# Patient Record
Sex: FEMALE | Race: ASIAN | Employment: FULL TIME | URBAN - METROPOLITAN AREA
[De-identification: names, ages, dates, MRNs, and addresses within clinical notes are randomized per-mention and may not be internally consistent; named-entity substitution may affect disease eponyms.]

---

## 2017-01-25 ENCOUNTER — OFFICE VISIT (OUTPATIENT)
Dept: FAMILY MEDICINE CLINIC | Facility: CLINIC | Age: 47
End: 2017-01-25

## 2017-01-25 VITALS
HEIGHT: 60 IN | TEMPERATURE: 99 F | SYSTOLIC BLOOD PRESSURE: 120 MMHG | BODY MASS INDEX: 29.84 KG/M2 | RESPIRATION RATE: 22 BRPM | WEIGHT: 152 LBS | HEART RATE: 74 BPM | DIASTOLIC BLOOD PRESSURE: 80 MMHG

## 2017-01-25 DIAGNOSIS — R03.0 ELEVATED BLOOD PRESSURE READING: Primary | ICD-10-CM

## 2017-01-25 DIAGNOSIS — E04.9 GOITER: ICD-10-CM

## 2017-01-25 DIAGNOSIS — Z00.00 GENERAL MEDICAL EXAM: ICD-10-CM

## 2017-01-25 PROCEDURE — 99213 OFFICE O/P EST LOW 20 MIN: CPT | Performed by: FAMILY MEDICINE

## 2017-01-25 RX ORDER — HYDRALAZINE HYDROCHLORIDE 10 MG/1
10 TABLET, FILM COATED ORAL 4 TIMES DAILY PRN
Qty: 90 TABLET | Refills: 0 | Status: SHIPPED | OUTPATIENT
Start: 2017-01-25 | End: 2017-05-10

## 2017-01-25 NOTE — PROGRESS NOTES
HPI:   Lori Gonsales is a 42year old female here to discuss blood pressure    Pt has high bp in the am after her night shift at [employer] and if she consumes salt. Pt has high bp anytime she notices a HA.      HTN runs in her family   Pt is exercising an distress  CARDIO: RRR without murmur  LUNGS: clear to auscultation  NECK: supple, +  thyromegaly  HEENT: atraumatic, normocephalic,ears and throat are clear  EYES:PERRLA, EOMI, normal,conjunctiva are clear  SKIN: norashes,no suspicious lesions  GI: good BS

## 2017-02-13 ENCOUNTER — LAB ENCOUNTER (OUTPATIENT)
Dept: LAB | Age: 47
End: 2017-02-13
Attending: FAMILY MEDICINE
Payer: COMMERCIAL

## 2017-02-13 DIAGNOSIS — Z00.00 GENERAL MEDICAL EXAM: ICD-10-CM

## 2017-02-13 LAB
25-HYDROXYVITAMIN D (TOTAL): 39 NG/ML (ref 30–100)
ALBUMIN SERPL-MCNC: 4 G/DL (ref 3.5–4.8)
ALP LIVER SERPL-CCNC: 65 U/L (ref 37–98)
ALT SERPL-CCNC: 42 U/L (ref 14–54)
AST SERPL-CCNC: 28 U/L (ref 15–41)
BASOPHILS # BLD AUTO: 0.06 X10(3) UL (ref 0–0.1)
BASOPHILS NFR BLD AUTO: 0.8 %
BILIRUB SERPL-MCNC: 0.3 MG/DL (ref 0.1–2)
BUN BLD-MCNC: 10 MG/DL (ref 8–20)
CALCIUM BLD-MCNC: 9.2 MG/DL (ref 8.3–10.3)
CHLORIDE: 104 MMOL/L (ref 101–111)
CHOLEST SMN-MCNC: 173 MG/DL (ref ?–200)
CO2: 26 MMOL/L (ref 22–32)
CREAT BLD-MCNC: 0.73 MG/DL (ref 0.55–1.02)
DEPRECATED HBV CORE AB SER IA-ACNC: 81.6 NG/ML (ref 10–291)
EOSINOPHIL # BLD AUTO: 0.22 X10(3) UL (ref 0–0.3)
EOSINOPHIL NFR BLD AUTO: 2.9 %
ERYTHROCYTE [DISTWIDTH] IN BLOOD BY AUTOMATED COUNT: 13.9 % (ref 11.5–16)
FREE T4: 1.2 NG/DL (ref 0.9–1.8)
GLUCOSE BLD-MCNC: 89 MG/DL (ref 70–99)
HAV AB SERPL IA-ACNC: 712 PG/ML (ref 193–986)
HCT VFR BLD AUTO: 42.6 % (ref 34–50)
HDLC SERPL-MCNC: 62 MG/DL (ref 45–?)
HDLC SERPL: 2.79 {RATIO} (ref ?–4.44)
HGB BLD-MCNC: 13.9 G/DL (ref 12–16)
IMMATURE GRANULOCYTE COUNT: 0.02 X10(3) UL (ref 0–1)
IMMATURE GRANULOCYTE RATIO %: 0.3 %
IRON SATURATION: 12 % (ref 13–45)
IRON: 49 UG/DL (ref 28–170)
LDLC SERPL CALC-MCNC: 98 MG/DL (ref ?–130)
LYMPHOCYTES # BLD AUTO: 2.01 X10(3) UL (ref 0.9–4)
LYMPHOCYTES NFR BLD AUTO: 26.9 %
M PROTEIN MFR SERPL ELPH: 7.9 G/DL (ref 6.1–8.3)
MCH RBC QN AUTO: 29.9 PG (ref 27–33.2)
MCHC RBC AUTO-ENTMCNC: 32.6 G/DL (ref 31–37)
MCV RBC AUTO: 91.6 FL (ref 81–100)
MONOCYTES # BLD AUTO: 0.67 X10(3) UL (ref 0.1–0.6)
MONOCYTES NFR BLD AUTO: 9 %
NEUTROPHIL ABS PRELIM: 4.48 X10 (3) UL (ref 1.3–6.7)
NEUTROPHILS # BLD AUTO: 4.48 X10(3) UL (ref 1.3–6.7)
NEUTROPHILS NFR BLD AUTO: 60.1 %
NONHDLC SERPL-MCNC: 111 MG/DL (ref ?–130)
PLATELET # BLD AUTO: 329 10(3)UL (ref 150–450)
POTASSIUM SERPL-SCNC: 4.1 MMOL/L (ref 3.6–5.1)
RBC # BLD AUTO: 4.65 X10(6)UL (ref 3.8–5.1)
RED CELL DISTRIBUTION WIDTH-SD: 46.6 FL (ref 35.1–46.3)
SODIUM SERPL-SCNC: 138 MMOL/L (ref 136–144)
T3FREE SERPL-MCNC: 2.44 PG/ML (ref 2.3–4.2)
TOTAL IRON BINDING CAPACITY: 408 UG/DL (ref 298–536)
TRANSFERRIN: 274 MG/DL (ref 200–360)
TRIGLYCERIDES: 66 MG/DL (ref ?–150)
TSI SER-ACNC: 0.65 MIU/ML (ref 0.35–5.5)
VLDL: 13 MG/DL (ref 5–40)
WBC # BLD AUTO: 7.5 X10(3) UL (ref 4–13)

## 2017-02-13 PROCEDURE — 84443 ASSAY THYROID STIM HORMONE: CPT

## 2017-02-13 PROCEDURE — 83540 ASSAY OF IRON: CPT

## 2017-02-13 PROCEDURE — 84481 FREE ASSAY (FT-3): CPT

## 2017-02-13 PROCEDURE — 82607 VITAMIN B-12: CPT

## 2017-02-13 PROCEDURE — 82728 ASSAY OF FERRITIN: CPT

## 2017-02-13 PROCEDURE — 83550 IRON BINDING TEST: CPT

## 2017-02-13 PROCEDURE — 84439 ASSAY OF FREE THYROXINE: CPT

## 2017-02-13 PROCEDURE — 36415 COLL VENOUS BLD VENIPUNCTURE: CPT

## 2017-02-13 PROCEDURE — 82306 VITAMIN D 25 HYDROXY: CPT

## 2017-02-13 PROCEDURE — 85025 COMPLETE CBC W/AUTO DIFF WBC: CPT

## 2017-02-13 PROCEDURE — 80061 LIPID PANEL: CPT

## 2017-02-13 PROCEDURE — 80053 COMPREHEN METABOLIC PANEL: CPT

## 2017-03-30 ENCOUNTER — OFFICE VISIT (OUTPATIENT)
Dept: FAMILY MEDICINE CLINIC | Facility: CLINIC | Age: 47
End: 2017-03-30

## 2017-03-30 VITALS
DIASTOLIC BLOOD PRESSURE: 80 MMHG | HEART RATE: 79 BPM | BODY MASS INDEX: 30 KG/M2 | WEIGHT: 152 LBS | SYSTOLIC BLOOD PRESSURE: 120 MMHG | OXYGEN SATURATION: 98 % | RESPIRATION RATE: 16 BRPM | TEMPERATURE: 98 F

## 2017-03-30 DIAGNOSIS — Z23 NEED FOR TDAP VACCINATION: Primary | ICD-10-CM

## 2017-03-30 PROCEDURE — 90715 TDAP VACCINE 7 YRS/> IM: CPT | Performed by: NURSE PRACTITIONER

## 2017-03-30 PROCEDURE — 90471 IMMUNIZATION ADMIN: CPT | Performed by: NURSE PRACTITIONER

## 2017-04-17 ENCOUNTER — TELEPHONE (OUTPATIENT)
Dept: FAMILY MEDICINE CLINIC | Facility: CLINIC | Age: 47
End: 2017-04-17

## 2017-04-17 DIAGNOSIS — L84 CORNS: Primary | ICD-10-CM

## 2017-04-17 NOTE — TELEPHONE ENCOUNTER
Patient called central referrals, she is requesting a referral to podiatrist for a corn on her foot. Dr Tayo Watt.        Internal  Diagnosis:  L84  3 visits

## 2017-04-25 ENCOUNTER — OFFICE VISIT (OUTPATIENT)
Dept: FAMILY MEDICINE CLINIC | Facility: CLINIC | Age: 47
End: 2017-04-25

## 2017-04-25 VITALS
RESPIRATION RATE: 18 BRPM | HEART RATE: 70 BPM | TEMPERATURE: 99 F | BODY MASS INDEX: 28.33 KG/M2 | OXYGEN SATURATION: 99 % | DIASTOLIC BLOOD PRESSURE: 70 MMHG | SYSTOLIC BLOOD PRESSURE: 104 MMHG | HEIGHT: 61.5 IN | WEIGHT: 152 LBS

## 2017-04-25 DIAGNOSIS — B07.0 PLANTAR WART: ICD-10-CM

## 2017-04-25 DIAGNOSIS — M79.671 FOOT PAIN, RIGHT: Primary | ICD-10-CM

## 2017-04-25 PROCEDURE — 99213 OFFICE O/P EST LOW 20 MIN: CPT | Performed by: FAMILY MEDICINE

## 2017-04-25 NOTE — PROGRESS NOTES
HPI:    Patient ID: Emiliano Glynn is a 42year old female. Foot Pain   Pain location: bottom of right foot. This is a chronic problem. The current episode started more than 1 month ago. There has been no history of extremity trauma.  The problem occur in this encounter.        Meds This Visit:  No prescriptions requested or ordered in this encounter    Imaging & Referrals:  None       SI#[id number]

## 2017-05-10 ENCOUNTER — OFFICE VISIT (OUTPATIENT)
Dept: FAMILY MEDICINE CLINIC | Facility: CLINIC | Age: 47
End: 2017-05-10

## 2017-05-10 VITALS
WEIGHT: 154 LBS | SYSTOLIC BLOOD PRESSURE: 116 MMHG | BODY MASS INDEX: 28.7 KG/M2 | HEIGHT: 61.5 IN | RESPIRATION RATE: 18 BRPM | HEART RATE: 67 BPM | DIASTOLIC BLOOD PRESSURE: 74 MMHG | OXYGEN SATURATION: 98 %

## 2017-05-10 DIAGNOSIS — E04.9 GOITER: ICD-10-CM

## 2017-05-10 DIAGNOSIS — Z12.31 ENCOUNTER FOR SCREENING MAMMOGRAM FOR HIGH-RISK PATIENT: ICD-10-CM

## 2017-05-10 DIAGNOSIS — Z01.419 WELL WOMAN EXAM WITH ROUTINE GYNECOLOGICAL EXAM: Primary | ICD-10-CM

## 2017-05-10 PROCEDURE — 87624 HPV HI-RISK TYP POOLED RSLT: CPT | Performed by: FAMILY MEDICINE

## 2017-05-10 PROCEDURE — 88175 CYTOPATH C/V AUTO FLUID REDO: CPT | Performed by: FAMILY MEDICINE

## 2017-05-10 PROCEDURE — 99396 PREV VISIT EST AGE 40-64: CPT | Performed by: FAMILY MEDICINE

## 2017-05-10 NOTE — PROGRESS NOTES
HPI:   Adam Franklin is a 41year old female who presents for a complete physical exam.     Wt Readings from Last 6 Encounters:  05/10/17 : 154 lb  04/25/17 : 152 lb  03/30/17 : 152 lb  01/25/17 : 152 lb  02/11/16 : 152 lb  11/18/15 : 151 lb    Body mas otherwise  SKIN: denies any unusual skin lesions  EYES:denies blurred vision or double vision  HEENT: denies nasal congestion, sinus pain or ST  LUNGS: denies shortness of breath with exertion  CARDIOVASCULAR: denies chest pain on exertion  GI: denies abdo and self breast exams. Questions answered and patient indicates understanding of these issues and agrees to the plan. Follow up in 1 year or sooner if needed.

## 2017-05-23 ENCOUNTER — HOSPITAL ENCOUNTER (OUTPATIENT)
Dept: MAMMOGRAPHY | Age: 47
Discharge: HOME | End: 2017-05-23
Attending: FAMILY MEDICINE
Payer: COMMERCIAL

## 2017-05-23 ENCOUNTER — HOSPITAL ENCOUNTER (OUTPATIENT)
Dept: ULTRASOUND IMAGING | Age: 47
Discharge: HOME | End: 2017-05-23
Attending: FAMILY MEDICINE
Payer: COMMERCIAL

## 2017-05-23 DIAGNOSIS — Z12.31 ENCOUNTER FOR SCREENING MAMMOGRAM FOR HIGH-RISK PATIENT: ICD-10-CM

## 2017-05-23 DIAGNOSIS — E04.9 GOITER: ICD-10-CM

## 2017-05-23 PROCEDURE — 77067 SCR MAMMO BI INCL CAD: CPT | Performed by: FAMILY MEDICINE

## 2017-05-23 PROCEDURE — 77063 BREAST TOMOSYNTHESIS BI: CPT | Performed by: FAMILY MEDICINE

## 2017-05-23 PROCEDURE — 76536 US EXAM OF HEAD AND NECK: CPT | Performed by: FAMILY MEDICINE

## 2017-11-14 ENCOUNTER — TELEPHONE (OUTPATIENT)
Dept: FAMILY MEDICINE CLINIC | Facility: CLINIC | Age: 47
End: 2017-11-14

## 2017-11-14 NOTE — TELEPHONE ENCOUNTER
Patient called and is complaining of headache and HBP . She wanted a refill of her BP medication. I don't see one on file. She stated that she used to take it and then stopped because she was exercising. She gave it to someone in the family.   Sending to

## 2017-11-14 NOTE — TELEPHONE ENCOUNTER
Pt states has been monitoring b/p, and has been eating healthy, and exercising. Having headaches after her menstrual cycle. Checked b/p this afternoon 140/95. Pt using motrin for migraine with no relief. No chest pain. Migraines continue.   Advised pt to

## 2017-11-16 ENCOUNTER — OFFICE VISIT (OUTPATIENT)
Dept: FAMILY MEDICINE CLINIC | Facility: CLINIC | Age: 47
End: 2017-11-16

## 2017-11-16 VITALS
TEMPERATURE: 98 F | WEIGHT: 158 LBS | OXYGEN SATURATION: 97 % | SYSTOLIC BLOOD PRESSURE: 136 MMHG | BODY MASS INDEX: 29.83 KG/M2 | DIASTOLIC BLOOD PRESSURE: 80 MMHG | RESPIRATION RATE: 16 BRPM | HEART RATE: 70 BPM | HEIGHT: 61 IN

## 2017-11-16 DIAGNOSIS — F43.9 STRESS: ICD-10-CM

## 2017-11-16 DIAGNOSIS — I10 ESSENTIAL HYPERTENSION: Primary | ICD-10-CM

## 2017-11-16 PROCEDURE — 99213 OFFICE O/P EST LOW 20 MIN: CPT | Performed by: FAMILY MEDICINE

## 2017-11-16 RX ORDER — METOPROLOL SUCCINATE 25 MG/1
25 TABLET, EXTENDED RELEASE ORAL DAILY
Qty: 30 TABLET | Refills: 0 | Status: SHIPPED | OUTPATIENT
Start: 2017-11-16 | End: 2018-09-05

## 2017-11-16 NOTE — PROGRESS NOTES
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 Pt is now reducing salt   No regular exercise    Pt has high bp anytime she notices a HA.      HTN runs in her famil murmur  LUNGS: clear to auscultation  NECK: supple, no JVD, no carotid bruit   HEENT: atraumatic, normocephalic,ears and throat are clear  EYES:PERRLA, EOMI, normal,conjunctiva are clear  SKIN: norashes,no suspicious lesions  GI: good BS's,no masses, HSM o

## 2017-12-13 ENCOUNTER — TELEPHONE (OUTPATIENT)
Dept: FAMILY MEDICINE CLINIC | Facility: CLINIC | Age: 47
End: 2017-12-13

## 2017-12-13 NOTE — TELEPHONE ENCOUNTER
Pt had a 12 noon appt with Rojelio Kimbrough today and no Showed. When called she states she called to cancel. PSR's here this AM state that no call was received by this patient. This appt made a NO Show today.

## 2018-09-05 ENCOUNTER — OFFICE VISIT (OUTPATIENT)
Dept: FAMILY MEDICINE CLINIC | Facility: CLINIC | Age: 48
End: 2018-09-05

## 2018-09-05 VITALS
BODY MASS INDEX: 30.21 KG/M2 | DIASTOLIC BLOOD PRESSURE: 86 MMHG | HEART RATE: 78 BPM | RESPIRATION RATE: 16 BRPM | SYSTOLIC BLOOD PRESSURE: 122 MMHG | HEIGHT: 61 IN | WEIGHT: 160 LBS | TEMPERATURE: 99 F

## 2018-09-05 DIAGNOSIS — Z01.419 WELL WOMAN EXAM WITH ROUTINE GYNECOLOGICAL EXAM: Primary | ICD-10-CM

## 2018-09-05 DIAGNOSIS — Z00.00 ANNUAL PHYSICAL EXAM: ICD-10-CM

## 2018-09-05 DIAGNOSIS — E04.9 GOITER: ICD-10-CM

## 2018-09-05 DIAGNOSIS — F43.9 STRESS: ICD-10-CM

## 2018-09-05 DIAGNOSIS — G43.009 MIGRAINE WITHOUT AURA AND WITHOUT STATUS MIGRAINOSUS, NOT INTRACTABLE: ICD-10-CM

## 2018-09-05 DIAGNOSIS — Z80.0 FAMILY HISTORY OF COLON CANCER: ICD-10-CM

## 2018-09-05 DIAGNOSIS — Z12.31 ENCOUNTER FOR SCREENING MAMMOGRAM FOR HIGH-RISK PATIENT: ICD-10-CM

## 2018-09-05 PROCEDURE — 99396 PREV VISIT EST AGE 40-64: CPT | Performed by: FAMILY MEDICINE

## 2018-09-05 RX ORDER — METOPROLOL SUCCINATE 25 MG/1
25 TABLET, EXTENDED RELEASE ORAL DAILY
Qty: 30 TABLET | Refills: 0 | Status: SHIPPED | OUTPATIENT
Start: 2018-09-05 | End: 2019-09-11

## 2018-09-05 NOTE — PROGRESS NOTES
HPI:   Juan Lyle is a 57year old female who presents for a complete physical exam.     Wt Readings from Last 6 Encounters:  09/05/18 : 160 lb  11/16/17 : 158 lb  05/10/17 : 154 lb  04/25/17 : 152 lb  03/30/17 : 152 lb  01/25/17 : 152 lb    Body mas 63's   • colon cancer[other] [OTHER] Other      family hx   • emphysema[other] [OTHER] Father    • ulcer[other] [OTHER] Father      gastric   • Hypertension Mother       Social History:   Smoking status: Never Smoker MUSCULOSKELETAL: back is not tender,FROM of the back  EXTREMITIES: no cyanosis, clubbing or edema  NEURO: cranial nerves are intact,motor and sensory are grossly intact    ASSESSMENT ND PLAN:   Viraj Holly is a 57year old female here with     1.  We

## 2018-10-02 ENCOUNTER — LAB ENCOUNTER (OUTPATIENT)
Dept: LAB | Age: 48
End: 2018-10-02
Attending: FAMILY MEDICINE
Payer: COMMERCIAL

## 2018-10-02 DIAGNOSIS — Z00.00 ANNUAL PHYSICAL EXAM: ICD-10-CM

## 2018-10-02 PROCEDURE — 80061 LIPID PANEL: CPT

## 2018-10-02 PROCEDURE — 82607 VITAMIN B-12: CPT

## 2018-10-02 PROCEDURE — 36415 COLL VENOUS BLD VENIPUNCTURE: CPT

## 2018-10-02 PROCEDURE — 82306 VITAMIN D 25 HYDROXY: CPT

## 2018-10-02 PROCEDURE — 80053 COMPREHEN METABOLIC PANEL: CPT

## 2018-10-02 PROCEDURE — 84439 ASSAY OF FREE THYROXINE: CPT

## 2018-10-02 PROCEDURE — 84443 ASSAY THYROID STIM HORMONE: CPT

## 2018-10-02 PROCEDURE — 85025 COMPLETE CBC W/AUTO DIFF WBC: CPT

## 2018-10-04 ENCOUNTER — HOSPITAL ENCOUNTER (OUTPATIENT)
Dept: MAMMOGRAPHY | Age: 48
Discharge: HOME | End: 2018-10-04
Attending: FAMILY MEDICINE
Payer: COMMERCIAL

## 2018-10-04 ENCOUNTER — HOSPITAL ENCOUNTER (OUTPATIENT)
Dept: ULTRASOUND IMAGING | Age: 48
Discharge: HOME | End: 2018-10-04
Attending: FAMILY MEDICINE
Payer: COMMERCIAL

## 2018-10-04 DIAGNOSIS — E04.9 GOITER: ICD-10-CM

## 2018-10-04 DIAGNOSIS — Z12.31 ENCOUNTER FOR SCREENING MAMMOGRAM FOR HIGH-RISK PATIENT: ICD-10-CM

## 2018-10-04 PROCEDURE — 76536 US EXAM OF HEAD AND NECK: CPT | Performed by: FAMILY MEDICINE

## 2018-10-04 PROCEDURE — 77067 SCR MAMMO BI INCL CAD: CPT | Performed by: FAMILY MEDICINE

## 2018-10-04 PROCEDURE — 77063 BREAST TOMOSYNTHESIS BI: CPT | Performed by: FAMILY MEDICINE

## 2019-09-11 ENCOUNTER — OFFICE VISIT (OUTPATIENT)
Dept: FAMILY MEDICINE CLINIC | Facility: CLINIC | Age: 49
End: 2019-09-11

## 2019-09-11 VITALS
DIASTOLIC BLOOD PRESSURE: 70 MMHG | HEIGHT: 61 IN | OXYGEN SATURATION: 98 % | BODY MASS INDEX: 30.21 KG/M2 | RESPIRATION RATE: 16 BRPM | HEART RATE: 94 BPM | TEMPERATURE: 98 F | SYSTOLIC BLOOD PRESSURE: 106 MMHG | WEIGHT: 160 LBS

## 2019-09-11 DIAGNOSIS — Z00.00 ANNUAL PHYSICAL EXAM: ICD-10-CM

## 2019-09-11 DIAGNOSIS — Z01.419 WELL WOMAN EXAM WITH ROUTINE GYNECOLOGICAL EXAM: Primary | ICD-10-CM

## 2019-09-11 DIAGNOSIS — Z12.31 ENCOUNTER FOR SCREENING MAMMOGRAM FOR HIGH-RISK PATIENT: ICD-10-CM

## 2019-09-11 DIAGNOSIS — E04.9 GOITER: ICD-10-CM

## 2019-09-11 PROCEDURE — 88175 CYTOPATH C/V AUTO FLUID REDO: CPT | Performed by: FAMILY MEDICINE

## 2019-09-11 PROCEDURE — 87624 HPV HI-RISK TYP POOLED RSLT: CPT | Performed by: FAMILY MEDICINE

## 2019-09-11 PROCEDURE — 99396 PREV VISIT EST AGE 40-64: CPT | Performed by: FAMILY MEDICINE

## 2019-09-11 NOTE — PROGRESS NOTES
HPI:   Sallyanne Soulier is a 54year old female who presents for a complete physical exam.     Wt Readings from Last 6 Encounters:  09/11/19 : 160 lb  09/05/18 : 160 lb  11/16/17 : 158 lb  05/10/17 : 154 lb  04/25/17 : 152 lb  03/30/17 : 152 lb    Body mas Family History   Problem Relation Age of Onset   • Breast Cancer Maternal Aunt 58        early 63's   • Other (colon cancer[other]) Other         family hx   • Other (emphysema[other]) Father    • Other (ulcer[other]) Father         gastric   • Hypertens Bimanual exam- no adnexal masses or tenderness  RECTAL:good rectal tone,no mass, brown stool, stool is OB negative   MUSCULOSKELETAL: back is not tender,FROM of the back  EXTREMITIES: no cyanosis, clubbing or edema  NEURO: cranial nerves are intact,motor a

## 2019-09-12 LAB — HPV I/H RISK 1 DNA SPEC QL NAA+PROBE: NEGATIVE

## 2019-09-27 ENCOUNTER — TELEPHONE (OUTPATIENT)
Dept: FAMILY MEDICINE CLINIC | Facility: CLINIC | Age: 49
End: 2019-09-27

## 2019-10-16 ENCOUNTER — LAB ENCOUNTER (OUTPATIENT)
Dept: LAB | Age: 49
End: 2019-10-16
Attending: FAMILY MEDICINE
Payer: COMMERCIAL

## 2019-10-16 ENCOUNTER — HOSPITAL ENCOUNTER (OUTPATIENT)
Dept: ULTRASOUND IMAGING | Age: 49
Discharge: HOME | End: 2019-10-16
Attending: FAMILY MEDICINE
Payer: COMMERCIAL

## 2019-10-16 ENCOUNTER — HOSPITAL ENCOUNTER (OUTPATIENT)
Dept: MAMMOGRAPHY | Age: 49
Discharge: HOME | End: 2019-10-16
Attending: FAMILY MEDICINE
Payer: COMMERCIAL

## 2019-10-16 DIAGNOSIS — Z00.00 ANNUAL PHYSICAL EXAM: ICD-10-CM

## 2019-10-16 DIAGNOSIS — E04.9 GOITER: ICD-10-CM

## 2019-10-16 DIAGNOSIS — Z12.31 ENCOUNTER FOR SCREENING MAMMOGRAM FOR HIGH-RISK PATIENT: ICD-10-CM

## 2019-10-16 PROCEDURE — 77063 BREAST TOMOSYNTHESIS BI: CPT | Performed by: FAMILY MEDICINE

## 2019-10-16 PROCEDURE — 77067 SCR MAMMO BI INCL CAD: CPT | Performed by: FAMILY MEDICINE

## 2019-10-16 PROCEDURE — 84439 ASSAY OF FREE THYROXINE: CPT

## 2019-10-16 PROCEDURE — 80053 COMPREHEN METABOLIC PANEL: CPT

## 2019-10-16 PROCEDURE — 76536 US EXAM OF HEAD AND NECK: CPT | Performed by: FAMILY MEDICINE

## 2019-10-16 PROCEDURE — 82306 VITAMIN D 25 HYDROXY: CPT

## 2019-10-16 PROCEDURE — 80061 LIPID PANEL: CPT

## 2019-10-16 PROCEDURE — 82607 VITAMIN B-12: CPT

## 2019-10-16 PROCEDURE — 85025 COMPLETE CBC W/AUTO DIFF WBC: CPT

## 2019-10-16 PROCEDURE — 84443 ASSAY THYROID STIM HORMONE: CPT

## 2019-10-16 PROCEDURE — 36415 COLL VENOUS BLD VENIPUNCTURE: CPT

## 2019-10-17 ENCOUNTER — TELEPHONE (OUTPATIENT)
Dept: FAMILY MEDICINE CLINIC | Facility: CLINIC | Age: 49
End: 2019-10-17

## 2020-01-22 ENCOUNTER — HOSPITAL ENCOUNTER (OUTPATIENT)
Dept: ULTRASOUND IMAGING | Facility: HOSPITAL | Age: 50
Discharge: HOME | End: 2020-01-22
Attending: OTOLARYNGOLOGY
Payer: COMMERCIAL

## 2020-01-22 DIAGNOSIS — E04.2 MULTINODULAR GOITER: ICD-10-CM

## 2020-01-22 PROCEDURE — 88173 CYTOPATH EVAL FNA REPORT: CPT | Performed by: OTOLARYNGOLOGY

## 2020-01-22 PROCEDURE — 10005 FNA BX W/US GDN 1ST LES: CPT | Performed by: OTOLARYNGOLOGY

## 2020-01-22 NOTE — PROGRESS NOTES
PROCEDURE: US FNA THYROID, GUIDED INCLD, FIRST LESION (CPT=10005)    COMPARISON: [facility], US, US THYROID (MJP=44460), 10/16/2019, 19:31.     INDICATIONS: E04.2 Nontoxic multinodular goiter    DESCRIPTION: The risks, benefits, and alternatives of the proc

## 2020-05-26 ENCOUNTER — VIRTUAL PHONE E/M (OUTPATIENT)
Dept: FAMILY MEDICINE CLINIC | Facility: CLINIC | Age: 50
End: 2020-05-26

## 2020-05-26 DIAGNOSIS — M54.16 CHRONIC RADICULAR LUMBAR PAIN: Primary | ICD-10-CM

## 2020-05-26 DIAGNOSIS — G89.29 CHRONIC RADICULAR LUMBAR PAIN: Primary | ICD-10-CM

## 2020-05-26 PROCEDURE — 99214 OFFICE O/P EST MOD 30 MIN: CPT | Performed by: FAMILY MEDICINE

## 2020-05-26 RX ORDER — PREDNISONE 20 MG/1
60 TABLET ORAL DAILY
Qty: 15 TABLET | Refills: 0 | Status: SHIPPED | OUTPATIENT
Start: 2020-05-26 | End: 2020-05-31

## 2020-05-26 RX ORDER — CYCLOBENZAPRINE HCL 5 MG
5 TABLET ORAL 3 TIMES DAILY PRN
Qty: 30 TABLET | Refills: 0 | Status: SHIPPED | OUTPATIENT
Start: 2020-05-26 | End: 2021-10-05

## 2020-05-26 NOTE — PROGRESS NOTES
Virtual Telephone Check-In    Brittaniemaggie ThorntonBonnie verbally consents to a Virtual/Telephone Check-In visit on 05/26/20. Patient has been referred to the [facility] website at [email or web address] to review the yearly Consent to Treat document.     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   D

## 2020-06-25 ENCOUNTER — OFFICE VISIT (OUTPATIENT)
Dept: FAMILY MEDICINE CLINIC | Facility: CLINIC | Age: 50
End: 2020-06-25

## 2020-06-25 DIAGNOSIS — Z02.9 ADMINISTRATIVE ENCOUNTER: Primary | ICD-10-CM

## 2020-06-25 NOTE — PROGRESS NOTES
Patient here for headache which she attributes to eating salty food but to request medication to temporarily reduce BP which has been up 159/100 since yesterday. \"I just want something I can take sometimes when I need it\" Referred to family doctor.

## 2020-06-26 ENCOUNTER — OFFICE VISIT (OUTPATIENT)
Dept: FAMILY MEDICINE CLINIC | Facility: CLINIC | Age: 50
End: 2020-06-26

## 2020-06-26 VITALS
RESPIRATION RATE: 16 BRPM | BODY MASS INDEX: 30.21 KG/M2 | WEIGHT: 160 LBS | DIASTOLIC BLOOD PRESSURE: 86 MMHG | SYSTOLIC BLOOD PRESSURE: 136 MMHG | OXYGEN SATURATION: 98 % | HEART RATE: 82 BPM | TEMPERATURE: 98 F | HEIGHT: 61 IN

## 2020-06-26 DIAGNOSIS — G43.009 MIGRAINE WITHOUT AURA AND WITHOUT STATUS MIGRAINOSUS, NOT INTRACTABLE: Primary | ICD-10-CM

## 2020-06-26 DIAGNOSIS — R03.0 ELEVATED BLOOD PRESSURE READING: ICD-10-CM

## 2020-06-26 PROCEDURE — 99213 OFFICE O/P EST LOW 20 MIN: CPT | Performed by: FAMILY MEDICINE

## 2020-06-26 RX ORDER — PROPRANOLOL HYDROCHLORIDE 20 MG/1
20 TABLET ORAL 2 TIMES DAILY
Qty: 60 TABLET | Refills: 0 | Status: SHIPPED | OUTPATIENT
Start: 2020-06-26 | End: 2021-07-14

## 2020-06-26 NOTE — PROGRESS NOTES
HPI:   Herlinda Helms is a 52year old female here to discuss blood pressure and HA    Pt needed prn bp meds in 2017 and 2018   Pt has had HA and high bp the last few days in the am   Pt has a left sided HA after her menses   FDLMP 6/22/2020  + family h/ denies abdominal pain,denies heartburn  MUSCULOSKELETAL: denies back pain  PSYCHE: denies depression or anxiety  HEMATOLOGIC: denies hx of anemia    EXAM:   /86   Pulse 82   Temp 97.6 °F (36.4 °C) (Skin)   Resp 16   Ht 61\"   Wt 160 lb (72.6 kg)   LM

## 2020-09-29 ENCOUNTER — OFFICE VISIT (OUTPATIENT)
Dept: FAMILY MEDICINE CLINIC | Facility: CLINIC | Age: 50
End: 2020-09-29

## 2020-09-29 VITALS
HEIGHT: 61 IN | DIASTOLIC BLOOD PRESSURE: 72 MMHG | WEIGHT: 163 LBS | BODY MASS INDEX: 30.78 KG/M2 | HEART RATE: 77 BPM | SYSTOLIC BLOOD PRESSURE: 126 MMHG | TEMPERATURE: 97 F

## 2020-09-29 DIAGNOSIS — Z00.00 ANNUAL PHYSICAL EXAM: ICD-10-CM

## 2020-09-29 DIAGNOSIS — Z12.31 ENCOUNTER FOR SCREENING MAMMOGRAM FOR HIGH-RISK PATIENT: ICD-10-CM

## 2020-09-29 DIAGNOSIS — Z01.419 WELL WOMAN EXAM WITH ROUTINE GYNECOLOGICAL EXAM: Primary | ICD-10-CM

## 2020-09-29 DIAGNOSIS — E04.9 GOITER: ICD-10-CM

## 2020-09-29 PROCEDURE — 3078F DIAST BP <80 MM HG: CPT | Performed by: FAMILY MEDICINE

## 2020-09-29 PROCEDURE — 99396 PREV VISIT EST AGE 40-64: CPT | Performed by: FAMILY MEDICINE

## 2020-09-29 PROCEDURE — 3074F SYST BP LT 130 MM HG: CPT | Performed by: FAMILY MEDICINE

## 2020-09-29 PROCEDURE — 3008F BODY MASS INDEX DOCD: CPT | Performed by: FAMILY MEDICINE

## 2020-09-29 NOTE — PROGRESS NOTES
HPI:   Eric Stevenson is a 52year old female who presents for a complete physical exam.     Wt Readings from Last 6 Encounters:  09/29/20 : 163 lb (73.9 kg)  06/26/20 : 160 lb (72.6 kg)  09/11/19 : 160 lb (72.6 kg)  09/05/18 : 160 lb (72.6 kg)  11/16/17 Diagnosis Date   • Abdominal pain, unspecified site    • Dysuria    • Personal history of urinary (tract) infection       Past Surgical History:   Procedure Laterality Date   • TUBAL LIGATION  1/1/08      Family History   Problem Relation Age of Onset axillary LAD, no masses no nipple discharge bilaterally  : external genitalia - no inguinal LAD, no lesions. Speculum exam- introitus is normal,scant discharge,cervix is pink.  Bimanual exam- no adnexal masses or tenderness  RECTAL:good rectal tone,no ma

## 2020-10-13 ENCOUNTER — LAB ENCOUNTER (OUTPATIENT)
Dept: LAB | Age: 50
End: 2020-10-13
Attending: FAMILY MEDICINE
Payer: COMMERCIAL

## 2020-10-13 ENCOUNTER — HOSPITAL ENCOUNTER (OUTPATIENT)
Dept: MAMMOGRAPHY | Age: 50
Discharge: HOME | End: 2020-10-13
Attending: FAMILY MEDICINE
Payer: COMMERCIAL

## 2020-10-13 ENCOUNTER — HOSPITAL ENCOUNTER (OUTPATIENT)
Dept: ULTRASOUND IMAGING | Age: 50
Discharge: HOME | End: 2020-10-13
Attending: FAMILY MEDICINE
Payer: COMMERCIAL

## 2020-10-13 DIAGNOSIS — E04.9 GOITER: ICD-10-CM

## 2020-10-13 DIAGNOSIS — R79.89 LOW TSH LEVEL: ICD-10-CM

## 2020-10-13 DIAGNOSIS — Z12.31 ENCOUNTER FOR SCREENING MAMMOGRAM FOR HIGH-RISK PATIENT: ICD-10-CM

## 2020-10-13 DIAGNOSIS — Z00.00 ANNUAL PHYSICAL EXAM: ICD-10-CM

## 2020-10-13 DIAGNOSIS — E78.00 ELEVATED LDL CHOLESTEROL LEVEL: ICD-10-CM

## 2020-10-13 PROCEDURE — 77063 BREAST TOMOSYNTHESIS BI: CPT | Performed by: FAMILY MEDICINE

## 2020-10-13 PROCEDURE — 86376 MICROSOMAL ANTIBODY EACH: CPT

## 2020-10-13 PROCEDURE — 86800 THYROGLOBULIN ANTIBODY: CPT

## 2020-10-13 PROCEDURE — 76536 US EXAM OF HEAD AND NECK: CPT | Performed by: FAMILY MEDICINE

## 2020-10-13 PROCEDURE — 84439 ASSAY OF FREE THYROXINE: CPT

## 2020-10-13 PROCEDURE — 80061 LIPID PANEL: CPT

## 2020-10-13 PROCEDURE — 80053 COMPREHEN METABOLIC PANEL: CPT

## 2020-10-13 PROCEDURE — 77067 SCR MAMMO BI INCL CAD: CPT | Performed by: FAMILY MEDICINE

## 2020-10-13 PROCEDURE — 84443 ASSAY THYROID STIM HORMONE: CPT

## 2020-10-13 PROCEDURE — 82607 VITAMIN B-12: CPT

## 2020-10-13 PROCEDURE — 85025 COMPLETE CBC W/AUTO DIFF WBC: CPT

## 2020-10-13 PROCEDURE — 82306 VITAMIN D 25 HYDROXY: CPT

## 2020-10-13 PROCEDURE — 36415 COLL VENOUS BLD VENIPUNCTURE: CPT

## 2020-10-30 ENCOUNTER — HOSPITAL ENCOUNTER (OUTPATIENT)
Dept: ULTRASOUND IMAGING | Age: 50
Discharge: HOME | End: 2020-10-30
Attending: FAMILY MEDICINE
Payer: COMMERCIAL

## 2020-10-30 ENCOUNTER — HOSPITAL ENCOUNTER (OUTPATIENT)
Dept: MAMMOGRAPHY | Age: 50
Discharge: HOME | End: 2020-10-30
Attending: FAMILY MEDICINE
Payer: COMMERCIAL

## 2020-10-30 DIAGNOSIS — R92.2 INCONCLUSIVE MAMMOGRAM: ICD-10-CM

## 2020-10-30 PROCEDURE — 77061 BREAST TOMOSYNTHESIS UNI: CPT | Performed by: FAMILY MEDICINE

## 2020-10-30 PROCEDURE — 76642 ULTRASOUND BREAST LIMITED: CPT | Performed by: FAMILY MEDICINE

## 2020-10-30 PROCEDURE — 77065 DX MAMMO INCL CAD UNI: CPT | Performed by: FAMILY MEDICINE

## 2021-01-12 ENCOUNTER — TELEMEDICINE (OUTPATIENT)
Dept: FAMILY MEDICINE CLINIC | Facility: CLINIC | Age: 51
End: 2021-01-12

## 2021-01-12 ENCOUNTER — TELEPHONE (OUTPATIENT)
Dept: FAMILY MEDICINE CLINIC | Facility: CLINIC | Age: 51
End: 2021-01-12

## 2021-01-12 VITALS — SYSTOLIC BLOOD PRESSURE: 140 MMHG | DIASTOLIC BLOOD PRESSURE: 90 MMHG

## 2021-01-12 DIAGNOSIS — R03.0 ELEVATED BLOOD PRESSURE READING: Primary | ICD-10-CM

## 2021-01-12 DIAGNOSIS — F43.9 STRESS: ICD-10-CM

## 2021-01-12 DIAGNOSIS — G43.009 MIGRAINE WITHOUT AURA AND WITHOUT STATUS MIGRAINOSUS, NOT INTRACTABLE: ICD-10-CM

## 2021-01-12 PROCEDURE — 99213 OFFICE O/P EST LOW 20 MIN: CPT | Performed by: FAMILY MEDICINE

## 2021-01-12 PROCEDURE — 3080F DIAST BP >= 90 MM HG: CPT | Performed by: FAMILY MEDICINE

## 2021-01-12 PROCEDURE — 3077F SYST BP >= 140 MM HG: CPT | Performed by: FAMILY MEDICINE

## 2021-01-12 RX ORDER — LOSARTAN POTASSIUM 50 MG/1
50 TABLET ORAL DAILY
Qty: 30 TABLET | Refills: 0 | Status: SHIPPED | OUTPATIENT
Start: 2021-01-12 | End: 2021-02-18

## 2021-01-12 RX ORDER — METOPROLOL SUCCINATE 25 MG/1
25 TABLET, EXTENDED RELEASE ORAL DAILY
Qty: 30 TABLET | Refills: 0 | OUTPATIENT
Start: 2021-01-12

## 2021-01-12 NOTE — PROGRESS NOTES
This visit is conducted using Telemedicine with live, interactive video and audio.     Telehealth outside of [address] Verbal Consent   I conducted a telehealth visit with Jennifer Venegas today, 01/12/21, which was completed using two-way, real-t normal (140/90)    Brother on lisinopril   Pt was working out some   Patient denies chest pain, shortness of breath, dizziness, and lightheadedness. No exertional symptoms.       Current Outpatient Medications   Medication Sig Dispense Refill   • [place]ano cooperative, Normocephalic, without obvious abnormality, atraumatic, lips, mucosa, and tongue normal; teeth and gums normal, Speaking in full sentences comfortably, Normal work of breathing, Skin color, texture, turgor normal. No rashes or lesions and age

## 2021-01-12 NOTE — TELEPHONE ENCOUNTER
ALL PT BACK AND LET HER KNOW ABOUT GETTING LABS DONE AND ALSO ABOUT SCRIPT SENT TO PHARMACY.    PT IS SCHEDULING LABS AND FOLLOW  UP

## 2021-01-12 NOTE — TELEPHONE ENCOUNTER
Last refill of Metoprolol was in 2018, pt states she has not been taking because b/p has been fine. Today 160/105 this morning, headache, no chest pain, wants to start back on Metoprolol   offered appt, pt refused, advised IC, pt refused, states had appt with LE in Sept. B/P has been normal, only goes up when she eats certain foods so wants Metoprolol refilled. Again advised pt if b/p elevated to go to ER, pt understood, however refuses. Okay for refill?

## 2021-01-14 ENCOUNTER — LAB ENCOUNTER (OUTPATIENT)
Dept: LAB | Age: 51
End: 2021-01-14
Attending: FAMILY MEDICINE
Payer: COMMERCIAL

## 2021-01-14 DIAGNOSIS — R79.89 ABNORMAL TSH: ICD-10-CM

## 2021-01-14 DIAGNOSIS — E78.89 LIPIDS ABNORMAL: ICD-10-CM

## 2021-01-14 DIAGNOSIS — R03.0 ELEVATED BLOOD PRESSURE READING: ICD-10-CM

## 2021-01-14 LAB
ALBUMIN SERPL-MCNC: 3.8 G/DL (ref 3.4–5)
ALBUMIN/GLOB SERPL: 1 {RATIO} (ref 1–2)
ALP LIVER SERPL-CCNC: 50 U/L
ALT SERPL-CCNC: 40 U/L
ANION GAP SERPL CALC-SCNC: 6 MMOL/L (ref 0–18)
AST SERPL-CCNC: 25 U/L (ref 15–37)
BILIRUB SERPL-MCNC: 0.6 MG/DL (ref 0.1–2)
BUN BLD-MCNC: 13 MG/DL (ref 7–18)
BUN/CREAT SERPL: 19.7 (ref 10–20)
CALCIUM BLD-MCNC: 9.2 MG/DL (ref 8.5–10.1)
CHLORIDE SERPL-SCNC: 105 MMOL/L (ref 98–112)
CHOLEST SMN-MCNC: 210 MG/DL (ref ?–200)
CO2 SERPL-SCNC: 26 MMOL/L (ref 21–32)
CREAT BLD-MCNC: 0.66 MG/DL
GLOBULIN PLAS-MCNC: 3.8 G/DL (ref 2.8–4.4)
GLUCOSE BLD-MCNC: 92 MG/DL (ref 70–99)
HDLC SERPL-MCNC: 47 MG/DL (ref 40–59)
LDLC SERPL CALC-MCNC: 144 MG/DL (ref ?–100)
M PROTEIN MFR SERPL ELPH: 7.6 G/DL (ref 6.4–8.2)
NONHDLC SERPL-MCNC: 163 MG/DL (ref ?–130)
OSMOLALITY SERPL CALC.SUM OF ELEC: 284 MOSM/KG (ref 275–295)
PATIENT FASTING Y/N/NP: YES
PATIENT FASTING Y/N/NP: YES
POTASSIUM SERPL-SCNC: 4 MMOL/L (ref 3.5–5.1)
SODIUM SERPL-SCNC: 137 MMOL/L (ref 136–145)
T4 FREE SERPL-MCNC: 1.2 NG/DL (ref 0.8–1.7)
TRIGL SERPL-MCNC: 94 MG/DL (ref 30–149)
TSI SER-ACNC: 0.35 MIU/ML (ref 0.36–3.74)
VLDLC SERPL CALC-MCNC: 19 MG/DL (ref 0–30)

## 2021-01-14 PROCEDURE — 80061 LIPID PANEL: CPT

## 2021-01-14 PROCEDURE — 84439 ASSAY OF FREE THYROXINE: CPT

## 2021-01-14 PROCEDURE — 80053 COMPREHEN METABOLIC PANEL: CPT

## 2021-01-14 PROCEDURE — 84443 ASSAY THYROID STIM HORMONE: CPT

## 2021-01-14 PROCEDURE — 36415 COLL VENOUS BLD VENIPUNCTURE: CPT

## 2021-02-18 DIAGNOSIS — R03.0 ELEVATED BLOOD PRESSURE READING: ICD-10-CM

## 2021-02-18 RX ORDER — LOSARTAN POTASSIUM 50 MG/1
50 TABLET ORAL DAILY
Qty: 30 TABLET | Refills: 0 | Status: SHIPPED | OUTPATIENT
Start: 2021-02-18 | End: 2021-06-23

## 2021-06-23 DIAGNOSIS — R03.0 ELEVATED BLOOD PRESSURE READING: ICD-10-CM

## 2021-06-23 RX ORDER — LOSARTAN POTASSIUM 50 MG/1
50 TABLET ORAL DAILY
Qty: 30 TABLET | Refills: 0 | Status: SHIPPED | OUTPATIENT
Start: 2021-06-23 | End: 2021-07-14

## 2021-06-23 NOTE — TELEPHONE ENCOUNTER
Refill for Losartan:   Last tele med: elevated BP pressure. Last OV: annual: 9/29/2020  Last refill:  losartan Potassium 50 MG Oral Tab 30 tablet 0 2/18/2021    Sig:   Take 1 tablet (50 mg total) by mouth daily.      Route: Stan Moore with patient

## 2021-06-23 NOTE — TELEPHONE ENCOUNTER
Pt said enid requested losartan and waiting on response from office - pt was advised we did not receive request.  pls fill as pt was provided pills from the pharmacy

## 2021-07-14 ENCOUNTER — OFFICE VISIT (OUTPATIENT)
Dept: FAMILY MEDICINE CLINIC | Facility: CLINIC | Age: 51
End: 2021-07-14

## 2021-07-14 VITALS
RESPIRATION RATE: 16 BRPM | BODY MASS INDEX: 30.4 KG/M2 | HEIGHT: 61 IN | HEART RATE: 78 BPM | SYSTOLIC BLOOD PRESSURE: 102 MMHG | WEIGHT: 161 LBS | OXYGEN SATURATION: 99 % | DIASTOLIC BLOOD PRESSURE: 72 MMHG

## 2021-07-14 DIAGNOSIS — R03.0 ELEVATED BLOOD PRESSURE READING: ICD-10-CM

## 2021-07-14 DIAGNOSIS — G89.29 CHRONIC RADICULAR LUMBAR PAIN: ICD-10-CM

## 2021-07-14 DIAGNOSIS — E78.00 ELEVATED CHOLESTEROL: Primary | ICD-10-CM

## 2021-07-14 DIAGNOSIS — M54.16 CHRONIC RADICULAR LUMBAR PAIN: ICD-10-CM

## 2021-07-14 DIAGNOSIS — G47.09 OTHER INSOMNIA: ICD-10-CM

## 2021-07-14 PROCEDURE — 99214 OFFICE O/P EST MOD 30 MIN: CPT | Performed by: FAMILY MEDICINE

## 2021-07-14 PROCEDURE — 3008F BODY MASS INDEX DOCD: CPT | Performed by: FAMILY MEDICINE

## 2021-07-14 PROCEDURE — 3074F SYST BP LT 130 MM HG: CPT | Performed by: FAMILY MEDICINE

## 2021-07-14 PROCEDURE — 3078F DIAST BP <80 MM HG: CPT | Performed by: FAMILY MEDICINE

## 2021-07-14 RX ORDER — LOSARTAN POTASSIUM 50 MG/1
50 TABLET ORAL DAILY
Qty: 90 TABLET | Refills: 1 | Status: SHIPPED | OUTPATIENT
Start: 2021-07-14 | End: 2021-10-05

## 2021-07-14 NOTE — PROGRESS NOTES
HPI:   Viraj Holly is a 54year old female who presents for bp concerns, palpitations, lumbar pain     bp has been normal on the losartan     Brother on lisinopril   Pt was working out some   Patient denies chest pain, shortness of breath, dizziness lesions  EYES:denies blurred vision or double vision  HEENT: denies nasal congestion, sinus pain or ST  LUNGS: denies shortness of breath with exertion  CARDIOVASCULAR: denies chest pain on exertion  GI: denies abdominal pain,denies heartburn  : denies d

## 2021-08-26 ENCOUNTER — TELEPHONE (OUTPATIENT)
Dept: PHYSICAL THERAPY | Facility: HOSPITAL | Age: 51
End: 2021-08-26

## 2021-08-30 ENCOUNTER — OFFICE VISIT (OUTPATIENT)
Dept: PHYSICAL THERAPY | Age: 51
End: 2021-08-30
Attending: FAMILY MEDICINE
Payer: COMMERCIAL

## 2021-08-30 DIAGNOSIS — G89.29 CHRONIC RADICULAR LUMBAR PAIN: ICD-10-CM

## 2021-08-30 DIAGNOSIS — M54.16 CHRONIC RADICULAR LUMBAR PAIN: ICD-10-CM

## 2021-08-30 PROCEDURE — 97110 THERAPEUTIC EXERCISES: CPT | Performed by: PHYSICAL THERAPIST

## 2021-08-30 PROCEDURE — 97161 PT EVAL LOW COMPLEX 20 MIN: CPT | Performed by: PHYSICAL THERAPIST

## 2021-08-30 NOTE — PROGRESS NOTES
SPINE EVALUATION:   Referring Physician: Dr. Faye Harry  Diagnosis: Chronic radicular lumbar pain (M54.16,G89.29)     Date of Service: 8/30/2021     PATIENT SUMMARY   Steffi Wilcox is a 54year old female who presents to therapy today with complaints of extension lordosis in place versus slumped sitting       T/L, Cervica AROM: (* denotes performed with pain)  WNL - no pain with ROM     Accessory motion: Lumbar pain with lumbar central PAS   Palpation: No soft tissue tenderness     Strength: (* denotes pe 48/100    Patient/Family/Caregiver was advised of these findings, precautions, and treatment options and has agreed to actively participate in planning and for this course of care.     Thank you for your referral. Please co-sign or sign and return this abdoulaye

## 2021-09-02 ENCOUNTER — OFFICE VISIT (OUTPATIENT)
Dept: PHYSICAL THERAPY | Age: 51
End: 2021-09-02
Attending: FAMILY MEDICINE
Payer: COMMERCIAL

## 2021-09-02 DIAGNOSIS — G89.29 CHRONIC RADICULAR LUMBAR PAIN: ICD-10-CM

## 2021-09-02 DIAGNOSIS — M54.16 CHRONIC RADICULAR LUMBAR PAIN: ICD-10-CM

## 2021-09-02 PROCEDURE — 97110 THERAPEUTIC EXERCISES: CPT | Performed by: PHYSICAL THERAPIST

## 2021-09-02 NOTE — PROGRESS NOTES
Dx: Chronic radicular lumbar pain (M54.16,G89.29)           Authorized # of Visits:  8  Fall Risk: standard         Precautions: n/a             Subjective:   Patient states she continues to have midline back pain.  She states she has right knee pain when g

## 2021-09-07 ENCOUNTER — HOSPITAL ENCOUNTER (OUTPATIENT)
Dept: GENERAL RADIOLOGY | Age: 51
Discharge: HOME | End: 2021-09-07
Attending: FAMILY MEDICINE
Payer: COMMERCIAL

## 2021-09-07 ENCOUNTER — HOSPITAL ENCOUNTER (OUTPATIENT)
Dept: ULTRASOUND IMAGING | Age: 51
Discharge: HOME | End: 2021-09-07
Attending: OTOLARYNGOLOGY
Payer: COMMERCIAL

## 2021-09-07 DIAGNOSIS — G89.29 CHRONIC RADICULAR LUMBAR PAIN: ICD-10-CM

## 2021-09-07 DIAGNOSIS — M54.16 CHRONIC RADICULAR LUMBAR PAIN: ICD-10-CM

## 2021-09-07 DIAGNOSIS — E04.2 NONTOXIC MULTINODULAR GOITER: ICD-10-CM

## 2021-09-07 PROCEDURE — 76536 US EXAM OF HEAD AND NECK: CPT | Performed by: OTOLARYNGOLOGY

## 2021-09-08 ENCOUNTER — OFFICE VISIT (OUTPATIENT)
Dept: PHYSICAL THERAPY | Age: 51
End: 2021-09-08
Attending: FAMILY MEDICINE
Payer: COMMERCIAL

## 2021-09-08 DIAGNOSIS — G89.29 CHRONIC RADICULAR LUMBAR PAIN: ICD-10-CM

## 2021-09-08 DIAGNOSIS — M54.16 CHRONIC RADICULAR LUMBAR PAIN: ICD-10-CM

## 2021-09-08 PROCEDURE — 97110 THERAPEUTIC EXERCISES: CPT | Performed by: PHYSICAL THERAPIST

## 2021-09-08 NOTE — PROGRESS NOTES
Dx: Chronic radicular lumbar pain (M54.16,G89.29)           Authorized # of Visits:  8  Fall Risk: standard         Precautions: n/a             Subjective:   No pain today   Objective:   HEP as noted. Reviewed stabilization sequence.  Pain with right elvis

## 2021-09-13 ENCOUNTER — APPOINTMENT (OUTPATIENT)
Dept: PHYSICAL THERAPY | Age: 51
End: 2021-09-13
Attending: FAMILY MEDICINE
Payer: COMMERCIAL

## 2021-09-21 ENCOUNTER — APPOINTMENT (OUTPATIENT)
Dept: PHYSICAL THERAPY | Age: 51
End: 2021-09-21
Attending: FAMILY MEDICINE
Payer: COMMERCIAL

## 2021-09-27 ENCOUNTER — OFFICE VISIT (OUTPATIENT)
Dept: PHYSICAL THERAPY | Age: 51
End: 2021-09-27
Attending: FAMILY MEDICINE
Payer: COMMERCIAL

## 2021-09-27 DIAGNOSIS — G89.29 CHRONIC RADICULAR LUMBAR PAIN: ICD-10-CM

## 2021-09-27 DIAGNOSIS — M54.16 CHRONIC RADICULAR LUMBAR PAIN: ICD-10-CM

## 2021-09-27 PROCEDURE — 97110 THERAPEUTIC EXERCISES: CPT | Performed by: PHYSICAL THERAPIST

## 2021-09-27 NOTE — PROGRESS NOTES
Dx: Chronic radicular lumbar pain (M54.16,G89.29)           Authorized # of Visits:  8  Fall Risk: standard         Precautions: n/a             Subjective:   Patient states that her right knee is hurting more than her back   Objective:   Lumbar pain provo Clam shells 10 reps x 3 (HEP)       TRX squats > 15 reps - right knee pain  Wall push ups (HEP) Quadruped rock with hip hinge and flexion - no pain        Lateral step up/down right LE - right knee pain  Lateral walk with blue band (HEP)        Prone hip e

## 2021-10-05 ENCOUNTER — OFFICE VISIT (OUTPATIENT)
Dept: FAMILY MEDICINE CLINIC | Facility: CLINIC | Age: 51
End: 2021-10-05

## 2021-10-05 VITALS
OXYGEN SATURATION: 99 % | HEIGHT: 61 IN | DIASTOLIC BLOOD PRESSURE: 64 MMHG | TEMPERATURE: 98 F | SYSTOLIC BLOOD PRESSURE: 118 MMHG | WEIGHT: 165 LBS | HEART RATE: 83 BPM | BODY MASS INDEX: 31.15 KG/M2 | RESPIRATION RATE: 16 BRPM

## 2021-10-05 DIAGNOSIS — R03.0 ELEVATED BLOOD PRESSURE READING: ICD-10-CM

## 2021-10-05 DIAGNOSIS — Z00.00 ANNUAL PHYSICAL EXAM: Primary | ICD-10-CM

## 2021-10-05 DIAGNOSIS — Z12.31 ENCOUNTER FOR SCREENING MAMMOGRAM FOR HIGH-RISK PATIENT: ICD-10-CM

## 2021-10-05 DIAGNOSIS — G89.29 CHRONIC RADICULAR LUMBAR PAIN: ICD-10-CM

## 2021-10-05 DIAGNOSIS — Z12.11 COLON CANCER SCREENING: ICD-10-CM

## 2021-10-05 DIAGNOSIS — M54.16 CHRONIC RADICULAR LUMBAR PAIN: ICD-10-CM

## 2021-10-05 PROCEDURE — 3008F BODY MASS INDEX DOCD: CPT | Performed by: FAMILY MEDICINE

## 2021-10-05 PROCEDURE — 3078F DIAST BP <80 MM HG: CPT | Performed by: FAMILY MEDICINE

## 2021-10-05 PROCEDURE — 99396 PREV VISIT EST AGE 40-64: CPT | Performed by: FAMILY MEDICINE

## 2021-10-05 PROCEDURE — 3074F SYST BP LT 130 MM HG: CPT | Performed by: FAMILY MEDICINE

## 2021-10-05 RX ORDER — LOSARTAN POTASSIUM 50 MG/1
50 TABLET ORAL DAILY
Qty: 90 TABLET | Refills: 1 | Status: SHIPPED | OUTPATIENT
Start: 2021-10-05 | End: 2021-12-27

## 2021-10-05 RX ORDER — CYCLOBENZAPRINE HCL 5 MG
5 TABLET ORAL 3 TIMES DAILY PRN
Qty: 30 TABLET | Refills: 0 | Status: SHIPPED | OUTPATIENT
Start: 2021-10-05

## 2021-10-05 NOTE — PROGRESS NOTES
HPI:   Dariel La is a 54year old female who presents for a complete physical exam.     Wt Readings from Last 6 Encounters:  10/05/21 : 165 lb (74.8 kg)  07/14/21 : 161 lb (73 kg)  09/29/20 : 163 lb (73.9 kg)  06/26/20 : 160 lb (72.6 kg)  09/11/19 : Diagnosis Date   • Abdominal pain, unspecified site    • Dysuria    • Personal history of urinary (tract) infection       Past Surgical History:   Procedure Laterality Date   • TUBAL LIGATION  1/1/08      Family History   Problem Relation Age of Onset tenderness  CHEST: no chest tenderness  BREAST: no axillary LAD, no masses no nipple discharge bilaterally  ((: external genitalia - no inguinal LAD, no lesions. Speculum exam- introitus is normal,scant discharge,cervix is pink.  Bimanual exam- no adnexa

## 2021-10-13 ENCOUNTER — LAB ENCOUNTER (OUTPATIENT)
Dept: LAB | Age: 51
End: 2021-10-13
Attending: FAMILY MEDICINE
Payer: COMMERCIAL

## 2021-10-13 DIAGNOSIS — Z00.00 ANNUAL PHYSICAL EXAM: ICD-10-CM

## 2021-10-13 PROCEDURE — 85025 COMPLETE CBC W/AUTO DIFF WBC: CPT

## 2021-10-13 PROCEDURE — 83036 HEMOGLOBIN GLYCOSYLATED A1C: CPT

## 2021-10-13 PROCEDURE — 82607 VITAMIN B-12: CPT

## 2021-10-13 PROCEDURE — 80061 LIPID PANEL: CPT

## 2021-10-13 PROCEDURE — 82306 VITAMIN D 25 HYDROXY: CPT

## 2021-10-13 PROCEDURE — 84439 ASSAY OF FREE THYROXINE: CPT

## 2021-10-13 PROCEDURE — 36415 COLL VENOUS BLD VENIPUNCTURE: CPT

## 2021-10-13 PROCEDURE — 84443 ASSAY THYROID STIM HORMONE: CPT

## 2021-10-13 PROCEDURE — 80053 COMPREHEN METABOLIC PANEL: CPT

## 2021-11-16 ENCOUNTER — OFFICE VISIT (OUTPATIENT)
Dept: FAMILY MEDICINE CLINIC | Facility: CLINIC | Age: 51
End: 2021-11-16

## 2021-11-16 VITALS
BODY MASS INDEX: 30.4 KG/M2 | DIASTOLIC BLOOD PRESSURE: 78 MMHG | HEART RATE: 69 BPM | OXYGEN SATURATION: 98 % | SYSTOLIC BLOOD PRESSURE: 110 MMHG | HEIGHT: 61 IN | WEIGHT: 161 LBS | RESPIRATION RATE: 16 BRPM

## 2021-11-16 DIAGNOSIS — E78.00 ELEVATED CHOLESTEROL: ICD-10-CM

## 2021-11-16 DIAGNOSIS — R73.9 HYPERGLYCEMIA: Primary | ICD-10-CM

## 2021-11-16 PROCEDURE — 3078F DIAST BP <80 MM HG: CPT | Performed by: FAMILY MEDICINE

## 2021-11-16 PROCEDURE — 3008F BODY MASS INDEX DOCD: CPT | Performed by: FAMILY MEDICINE

## 2021-11-16 PROCEDURE — 99213 OFFICE O/P EST LOW 20 MIN: CPT | Performed by: FAMILY MEDICINE

## 2021-11-16 PROCEDURE — 3074F SYST BP LT 130 MM HG: CPT | Performed by: FAMILY MEDICINE

## 2021-11-16 NOTE — PROGRESS NOTES
HPI:   Fletcher Guevara is a 54year old female who presents for lab f/u     Patient denies chest pain, shortness of breath, dizziness, and lightheadedness. No exertional symptoms.   Family h/o DM   Working out 3x per week   Discussed diet / has already m Diet: watches calories closely     REVIEW OF SYSTEMS:   GENERAL: feels well otherwise  SKIN: denies any unusual skin lesions  EYES:denies blurred vision or double vision  HEENT: denies nasal congestion, sinus pain or ST  LUNGS: denies shortness of breath w

## 2021-12-27 DIAGNOSIS — R03.0 ELEVATED BLOOD PRESSURE READING: ICD-10-CM

## 2021-12-27 RX ORDER — LOSARTAN POTASSIUM 50 MG/1
50 TABLET ORAL DAILY
Qty: 90 TABLET | Refills: 1 | Status: SHIPPED | OUTPATIENT
Start: 2021-12-27 | End: 2022-01-27

## 2022-01-26 ENCOUNTER — LAB ENCOUNTER (OUTPATIENT)
Dept: LAB | Age: 52
End: 2022-01-26
Attending: FAMILY MEDICINE
Payer: COMMERCIAL

## 2022-01-26 DIAGNOSIS — E78.00 ELEVATED CHOLESTEROL: ICD-10-CM

## 2022-01-26 DIAGNOSIS — R73.9 HYPERGLYCEMIA: ICD-10-CM

## 2022-01-26 LAB
ALBUMIN SERPL-MCNC: 3.7 G/DL (ref 3.4–5)
ALBUMIN/GLOB SERPL: 1.1 {RATIO} (ref 1–2)
ALP LIVER SERPL-CCNC: 50 U/L
ALT SERPL-CCNC: 26 U/L
ANION GAP SERPL CALC-SCNC: 3 MMOL/L (ref 0–18)
AST SERPL-CCNC: 15 U/L (ref 15–37)
BILIRUB SERPL-MCNC: 0.5 MG/DL (ref 0.1–2)
BUN BLD-MCNC: 12 MG/DL (ref 7–18)
CALCIUM BLD-MCNC: 9 MG/DL (ref 8.5–10.1)
CHLORIDE SERPL-SCNC: 110 MMOL/L (ref 98–112)
CHOLEST SERPL-MCNC: 178 MG/DL (ref ?–200)
CO2 SERPL-SCNC: 26 MMOL/L (ref 21–32)
CREAT BLD-MCNC: 0.7 MG/DL
EST. AVERAGE GLUCOSE BLD GHB EST-MCNC: 114 MG/DL (ref 68–126)
FASTING PATIENT LIPID ANSWER: YES
FASTING STATUS PATIENT QL REPORTED: YES
GLOBULIN PLAS-MCNC: 3.4 G/DL (ref 2.8–4.4)
GLUCOSE BLD-MCNC: 98 MG/DL (ref 70–99)
HBA1C MFR BLD: 5.6 % (ref ?–5.7)
HDLC SERPL-MCNC: 44 MG/DL (ref 40–59)
LDLC SERPL CALC-MCNC: 121 MG/DL (ref ?–100)
NONHDLC SERPL-MCNC: 134 MG/DL (ref ?–130)
OSMOLALITY SERPL CALC.SUM OF ELEC: 288 MOSM/KG (ref 275–295)
POTASSIUM SERPL-SCNC: 4.3 MMOL/L (ref 3.5–5.1)
PROT SERPL-MCNC: 7.1 G/DL (ref 6.4–8.2)
SODIUM SERPL-SCNC: 139 MMOL/L (ref 136–145)
TRIGL SERPL-MCNC: 67 MG/DL (ref 30–149)
VLDLC SERPL CALC-MCNC: 12 MG/DL (ref 0–30)

## 2022-01-26 PROCEDURE — 83036 HEMOGLOBIN GLYCOSYLATED A1C: CPT

## 2022-01-26 PROCEDURE — 80053 COMPREHEN METABOLIC PANEL: CPT

## 2022-01-26 PROCEDURE — 80061 LIPID PANEL: CPT

## 2022-01-26 PROCEDURE — 36415 COLL VENOUS BLD VENIPUNCTURE: CPT

## 2022-01-27 ENCOUNTER — OFFICE VISIT (OUTPATIENT)
Dept: FAMILY MEDICINE CLINIC | Facility: CLINIC | Age: 52
End: 2022-01-27
Payer: COMMERCIAL

## 2022-01-27 ENCOUNTER — HOSPITAL ENCOUNTER (OUTPATIENT)
Dept: GENERAL RADIOLOGY | Age: 52
Discharge: HOME | End: 2022-01-27
Attending: FAMILY MEDICINE
Payer: COMMERCIAL

## 2022-01-27 VITALS
WEIGHT: 161 LBS | BODY MASS INDEX: 30.4 KG/M2 | SYSTOLIC BLOOD PRESSURE: 124 MMHG | HEIGHT: 61 IN | OXYGEN SATURATION: 99 % | RESPIRATION RATE: 18 BRPM | TEMPERATURE: 98 F | HEART RATE: 82 BPM | DIASTOLIC BLOOD PRESSURE: 70 MMHG

## 2022-01-27 DIAGNOSIS — M54.16 CHRONIC RADICULAR LUMBAR PAIN: ICD-10-CM

## 2022-01-27 DIAGNOSIS — G89.29 CHRONIC RADICULAR LUMBAR PAIN: ICD-10-CM

## 2022-01-27 DIAGNOSIS — E78.00 ELEVATED CHOLESTEROL: ICD-10-CM

## 2022-01-27 DIAGNOSIS — R03.0 ELEVATED BLOOD PRESSURE READING: ICD-10-CM

## 2022-01-27 DIAGNOSIS — R73.9 HYPERGLYCEMIA: Primary | ICD-10-CM

## 2022-01-27 PROCEDURE — 3078F DIAST BP <80 MM HG: CPT | Performed by: FAMILY MEDICINE

## 2022-01-27 PROCEDURE — 3008F BODY MASS INDEX DOCD: CPT | Performed by: FAMILY MEDICINE

## 2022-01-27 PROCEDURE — 3074F SYST BP LT 130 MM HG: CPT | Performed by: FAMILY MEDICINE

## 2022-01-27 PROCEDURE — 99214 OFFICE O/P EST MOD 30 MIN: CPT | Performed by: FAMILY MEDICINE

## 2022-01-27 PROCEDURE — 72110 X-RAY EXAM L-2 SPINE 4/>VWS: CPT | Performed by: FAMILY MEDICINE

## 2022-01-27 RX ORDER — LOSARTAN POTASSIUM 50 MG/1
50 TABLET ORAL DAILY
Qty: 90 TABLET | Refills: 1 | Status: SHIPPED | OUTPATIENT
Start: 2022-01-27

## 2022-01-27 NOTE — PROGRESS NOTES
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   Diet: watches calories closel

## 2022-01-31 DIAGNOSIS — M54.16 CHRONIC RADICULAR LUMBAR PAIN: Primary | ICD-10-CM

## 2022-01-31 DIAGNOSIS — G89.29 CHRONIC RADICULAR LUMBAR PAIN: Primary | ICD-10-CM

## 2022-08-19 ENCOUNTER — OFFICE VISIT (OUTPATIENT)
Dept: FAMILY MEDICINE CLINIC | Facility: CLINIC | Age: 52
End: 2022-08-19
Payer: COMMERCIAL

## 2022-08-19 VITALS
HEART RATE: 76 BPM | HEIGHT: 61 IN | OXYGEN SATURATION: 99 % | WEIGHT: 162 LBS | DIASTOLIC BLOOD PRESSURE: 80 MMHG | RESPIRATION RATE: 24 BRPM | BODY MASS INDEX: 30.58 KG/M2 | TEMPERATURE: 98 F | SYSTOLIC BLOOD PRESSURE: 120 MMHG

## 2022-08-19 DIAGNOSIS — E04.2 MULTINODULAR GOITER: Primary | ICD-10-CM

## 2022-08-19 DIAGNOSIS — Z00.00 LABORATORY EXAMINATION ORDERED AS PART OF A ROUTINE GENERAL MEDICAL EXAMINATION: ICD-10-CM

## 2022-08-19 DIAGNOSIS — I10 PRIMARY HYPERTENSION: ICD-10-CM

## 2022-08-19 DIAGNOSIS — Z23 NEED FOR SHINGLES VACCINE: ICD-10-CM

## 2022-08-19 DIAGNOSIS — Z12.11 SCREENING FOR COLON CANCER: ICD-10-CM

## 2022-08-19 DIAGNOSIS — E78.00 ELEVATED CHOLESTEROL: ICD-10-CM

## 2022-08-19 DIAGNOSIS — Z12.31 ENCOUNTER FOR SCREENING MAMMOGRAM FOR MALIGNANT NEOPLASM OF BREAST: ICD-10-CM

## 2022-08-19 DIAGNOSIS — R73.9 HYPERGLYCEMIA: ICD-10-CM

## 2022-08-19 PROCEDURE — 90750 HZV VACC RECOMBINANT IM: CPT | Performed by: FAMILY MEDICINE

## 2022-08-19 PROCEDURE — 3079F DIAST BP 80-89 MM HG: CPT | Performed by: FAMILY MEDICINE

## 2022-08-19 PROCEDURE — 90471 IMMUNIZATION ADMIN: CPT | Performed by: FAMILY MEDICINE

## 2022-08-19 PROCEDURE — 99214 OFFICE O/P EST MOD 30 MIN: CPT | Performed by: FAMILY MEDICINE

## 2022-08-19 PROCEDURE — 3074F SYST BP LT 130 MM HG: CPT | Performed by: FAMILY MEDICINE

## 2022-08-19 PROCEDURE — 3008F BODY MASS INDEX DOCD: CPT | Performed by: FAMILY MEDICINE

## 2022-08-19 RX ORDER — LOSARTAN POTASSIUM 50 MG/1
50 TABLET ORAL DAILY
Qty: 90 TABLET | Refills: 1 | Status: SHIPPED | OUTPATIENT
Start: 2022-08-19

## 2022-08-19 NOTE — PATIENT INSTRUCTIONS
1. Complete labwork    2. Complete thyroid ultrasound     3. Complete mammogram    4. Follow up with GI doctor for colonoscopy.

## 2022-09-02 ENCOUNTER — HOSPITAL ENCOUNTER (OUTPATIENT)
Dept: ULTRASOUND IMAGING | Age: 52
Discharge: HOME | End: 2022-09-02
Attending: FAMILY MEDICINE
Payer: COMMERCIAL

## 2022-09-02 ENCOUNTER — HOSPITAL ENCOUNTER (OUTPATIENT)
Dept: MAMMOGRAPHY | Age: 52
Discharge: HOME | End: 2022-09-02
Attending: FAMILY MEDICINE
Payer: COMMERCIAL

## 2022-09-02 DIAGNOSIS — E04.2 MULTINODULAR GOITER: ICD-10-CM

## 2022-09-02 DIAGNOSIS — Z12.31 ENCOUNTER FOR SCREENING MAMMOGRAM FOR MALIGNANT NEOPLASM OF BREAST: ICD-10-CM

## 2022-09-02 PROCEDURE — 77063 BREAST TOMOSYNTHESIS BI: CPT | Performed by: FAMILY MEDICINE

## 2022-09-02 PROCEDURE — 76536 US EXAM OF HEAD AND NECK: CPT | Performed by: FAMILY MEDICINE

## 2022-09-02 PROCEDURE — 77067 SCR MAMMO BI INCL CAD: CPT | Performed by: FAMILY MEDICINE

## 2022-09-08 ENCOUNTER — TELEPHONE (OUTPATIENT)
Dept: FAMILY MEDICINE CLINIC | Facility: CLINIC | Age: 52
End: 2022-09-08

## 2022-09-08 NOTE — TELEPHONE ENCOUNTER
----- Message from Rubin Contreras MD sent at 9/6/2022  9:33 AM CDT -----  Stable mammogram, repeat annually.

## 2022-09-08 NOTE — TELEPHONE ENCOUNTER
----- Message from Romero Greer MD sent at 9/7/2022  1:55 PM CDT -----  Stable, repeat thyroid US in 1 year.

## 2023-01-10 ENCOUNTER — LAB ENCOUNTER (OUTPATIENT)
Dept: LAB | Age: 53
End: 2023-01-10
Attending: FAMILY MEDICINE
Payer: COMMERCIAL

## 2023-01-10 DIAGNOSIS — E78.00 ELEVATED CHOLESTEROL: ICD-10-CM

## 2023-01-10 DIAGNOSIS — Z00.00 LABORATORY EXAMINATION ORDERED AS PART OF A ROUTINE GENERAL MEDICAL EXAMINATION: ICD-10-CM

## 2023-01-10 DIAGNOSIS — R73.9 HYPERGLYCEMIA: ICD-10-CM

## 2023-01-10 LAB
ALBUMIN SERPL-MCNC: 3.7 G/DL (ref 3.4–5)
ALBUMIN/GLOB SERPL: 1 {RATIO} (ref 1–2)
ALP LIVER SERPL-CCNC: 57 U/L
ALT SERPL-CCNC: 42 U/L
ANION GAP SERPL CALC-SCNC: 6 MMOL/L (ref 0–18)
AST SERPL-CCNC: 24 U/L (ref 15–37)
BASOPHILS # BLD AUTO: 0.06 X10(3) UL (ref 0–0.2)
BASOPHILS NFR BLD AUTO: 1 %
BILIRUB SERPL-MCNC: 0.5 MG/DL (ref 0.1–2)
BUN BLD-MCNC: 13 MG/DL (ref 7–18)
CALCIUM BLD-MCNC: 9.3 MG/DL (ref 8.5–10.1)
CHLORIDE SERPL-SCNC: 108 MMOL/L (ref 98–112)
CHOLEST SERPL-MCNC: 192 MG/DL (ref ?–200)
CO2 SERPL-SCNC: 27 MMOL/L (ref 21–32)
CREAT BLD-MCNC: 0.65 MG/DL
EOSINOPHIL # BLD AUTO: 0.11 X10(3) UL (ref 0–0.7)
EOSINOPHIL NFR BLD AUTO: 1.8 %
ERYTHROCYTE [DISTWIDTH] IN BLOOD BY AUTOMATED COUNT: 14.2 %
EST. AVERAGE GLUCOSE BLD GHB EST-MCNC: 126 MG/DL (ref 68–126)
FASTING PATIENT LIPID ANSWER: YES
FASTING STATUS PATIENT QL REPORTED: YES
GFR SERPLBLD BASED ON 1.73 SQ M-ARVRAT: 107 ML/MIN/1.73M2 (ref 60–?)
GLOBULIN PLAS-MCNC: 3.7 G/DL (ref 2.8–4.4)
GLUCOSE BLD-MCNC: 108 MG/DL (ref 70–99)
HBA1C MFR BLD: 6 % (ref ?–5.7)
HCT VFR BLD AUTO: 41.8 %
HDLC SERPL-MCNC: 51 MG/DL (ref 40–59)
HGB BLD-MCNC: 13.9 G/DL
IMM GRANULOCYTES # BLD AUTO: 0.01 X10(3) UL (ref 0–1)
IMM GRANULOCYTES NFR BLD: 0.2 %
LDLC SERPL CALC-MCNC: 131 MG/DL (ref ?–100)
LYMPHOCYTES # BLD AUTO: 1.53 X10(3) UL (ref 1–4)
LYMPHOCYTES NFR BLD AUTO: 24.6 %
MCH RBC QN AUTO: 29.4 PG (ref 26–34)
MCHC RBC AUTO-ENTMCNC: 33.3 G/DL (ref 31–37)
MCV RBC AUTO: 88.4 FL
MONOCYTES # BLD AUTO: 0.53 X10(3) UL (ref 0.1–1)
MONOCYTES NFR BLD AUTO: 8.5 %
NEUTROPHILS # BLD AUTO: 3.97 X10 (3) UL (ref 1.5–7.7)
NEUTROPHILS # BLD AUTO: 3.97 X10(3) UL (ref 1.5–7.7)
NEUTROPHILS NFR BLD AUTO: 63.9 %
NONHDLC SERPL-MCNC: 141 MG/DL (ref ?–130)
OSMOLALITY SERPL CALC.SUM OF ELEC: 293 MOSM/KG (ref 275–295)
PLATELET # BLD AUTO: 338 10(3)UL (ref 150–450)
POTASSIUM SERPL-SCNC: 4 MMOL/L (ref 3.5–5.1)
PROT SERPL-MCNC: 7.4 G/DL (ref 6.4–8.2)
RBC # BLD AUTO: 4.73 X10(6)UL
SODIUM SERPL-SCNC: 141 MMOL/L (ref 136–145)
T3FREE SERPL-MCNC: 2.44 PG/ML (ref 2.4–4.2)
T4 FREE SERPL-MCNC: 1.2 NG/DL (ref 0.8–1.7)
TRIGL SERPL-MCNC: 51 MG/DL (ref 30–149)
TSI SER-ACNC: 0.27 MIU/ML (ref 0.36–3.74)
VLDLC SERPL CALC-MCNC: 9 MG/DL (ref 0–30)
WBC # BLD AUTO: 6.2 X10(3) UL (ref 4–11)

## 2023-01-10 PROCEDURE — 85025 COMPLETE CBC W/AUTO DIFF WBC: CPT

## 2023-01-10 PROCEDURE — 84443 ASSAY THYROID STIM HORMONE: CPT

## 2023-01-10 PROCEDURE — 80061 LIPID PANEL: CPT

## 2023-01-10 PROCEDURE — 83036 HEMOGLOBIN GLYCOSYLATED A1C: CPT

## 2023-01-10 PROCEDURE — 36415 COLL VENOUS BLD VENIPUNCTURE: CPT

## 2023-01-10 PROCEDURE — 84481 FREE ASSAY (FT-3): CPT

## 2023-01-10 PROCEDURE — 80053 COMPREHEN METABOLIC PANEL: CPT

## 2023-01-10 PROCEDURE — 84439 ASSAY OF FREE THYROXINE: CPT

## 2023-01-13 ENCOUNTER — TELEPHONE (OUTPATIENT)
Dept: FAMILY MEDICINE CLINIC | Facility: CLINIC | Age: 53
End: 2023-01-13

## 2023-01-13 NOTE — TELEPHONE ENCOUNTER
----- Message from Jermain Chaparro MD sent at 1/12/2023  3:52 PM CST -----  1. PreDM - follo low carb diet, regular exercise  2. Hyperlipidemia - low fat diet, regular exercise. 3. TSH low but thyroid hormone levels normal. Thyroid US pending.

## 2023-04-10 DIAGNOSIS — I10 PRIMARY HYPERTENSION: ICD-10-CM

## 2023-04-10 RX ORDER — LOSARTAN POTASSIUM 50 MG/1
TABLET ORAL
Qty: 90 TABLET | Refills: 0 | Status: SHIPPED | OUTPATIENT
Start: 2023-04-10

## 2023-04-13 ENCOUNTER — LAB ENCOUNTER (OUTPATIENT)
Dept: LAB | Age: 53
End: 2023-04-13
Attending: FAMILY MEDICINE
Payer: COMMERCIAL

## 2023-04-13 DIAGNOSIS — R79.89 LOW TSH LEVEL: ICD-10-CM

## 2023-04-13 LAB
T3FREE SERPL-MCNC: 2.67 PG/ML (ref 2.4–4.2)
T4 FREE SERPL-MCNC: 1.1 NG/DL (ref 0.8–1.7)
TSI SER-ACNC: 0.29 MIU/ML (ref 0.36–3.74)

## 2023-04-13 PROCEDURE — 84481 FREE ASSAY (FT-3): CPT

## 2023-04-13 PROCEDURE — 84443 ASSAY THYROID STIM HORMONE: CPT

## 2023-04-13 PROCEDURE — 84439 ASSAY OF FREE THYROXINE: CPT

## 2023-04-13 PROCEDURE — 36415 COLL VENOUS BLD VENIPUNCTURE: CPT

## 2023-04-13 NOTE — PATIENT INSTRUCTIONS
Start Diego Alpha for sleep  Follow up with counseling. Think about possibly sending medication if no improvement. Complete MRI  Continue rest, ice/heat, stretching exercises, muscle relaxant, tylenol and/or ibuprofen as needed for back/knee pain. Continue warm compresses for stye. Let us know if no improvement within next 2 weeks. Complete lab testing.

## 2023-04-17 DIAGNOSIS — R79.89 LOW TSH LEVEL: Primary | ICD-10-CM

## 2023-04-21 ENCOUNTER — HOSPITAL ENCOUNTER (OUTPATIENT)
Dept: MRI IMAGING | Age: 53
Discharge: HOME | End: 2023-04-21
Attending: FAMILY MEDICINE
Payer: COMMERCIAL

## 2023-04-21 DIAGNOSIS — M54.16 CHRONIC RADICULAR LUMBAR PAIN: ICD-10-CM

## 2023-04-21 DIAGNOSIS — G89.29 CHRONIC RADICULAR LUMBAR PAIN: ICD-10-CM

## 2023-04-21 PROCEDURE — 72148 MRI LUMBAR SPINE W/O DYE: CPT | Performed by: FAMILY MEDICINE

## 2023-07-09 DIAGNOSIS — I10 PRIMARY HYPERTENSION: ICD-10-CM

## 2023-07-10 RX ORDER — LOSARTAN POTASSIUM 50 MG/1
TABLET ORAL
Qty: 90 TABLET | Refills: 0 | Status: SHIPPED | OUTPATIENT
Start: 2023-07-10

## 2023-10-16 ENCOUNTER — OFFICE VISIT (OUTPATIENT)
Dept: FAMILY MEDICINE CLINIC | Facility: CLINIC | Age: 53
End: 2023-10-16

## 2023-10-16 VITALS
BODY MASS INDEX: 30.21 KG/M2 | SYSTOLIC BLOOD PRESSURE: 108 MMHG | RESPIRATION RATE: 16 BRPM | OXYGEN SATURATION: 98 % | HEART RATE: 72 BPM | DIASTOLIC BLOOD PRESSURE: 78 MMHG | HEIGHT: 61 IN | WEIGHT: 160 LBS

## 2023-10-16 DIAGNOSIS — Z12.11 SCREENING FOR COLON CANCER: ICD-10-CM

## 2023-10-16 DIAGNOSIS — G89.29 CHRONIC RADICULAR LUMBAR PAIN: ICD-10-CM

## 2023-10-16 DIAGNOSIS — R73.9 HYPERGLYCEMIA: ICD-10-CM

## 2023-10-16 DIAGNOSIS — Z00.00 WELLNESS EXAMINATION: Primary | ICD-10-CM

## 2023-10-16 DIAGNOSIS — Z00.00 LABORATORY EXAMINATION ORDERED AS PART OF A ROUTINE GENERAL MEDICAL EXAMINATION: ICD-10-CM

## 2023-10-16 DIAGNOSIS — M54.16 CHRONIC RADICULAR LUMBAR PAIN: ICD-10-CM

## 2023-10-16 DIAGNOSIS — E04.2 MULTINODULAR GOITER: ICD-10-CM

## 2023-10-16 DIAGNOSIS — Z12.31 ENCOUNTER FOR SCREENING MAMMOGRAM FOR MALIGNANT NEOPLASM OF BREAST: ICD-10-CM

## 2023-10-16 DIAGNOSIS — I10 PRIMARY HYPERTENSION: ICD-10-CM

## 2023-10-16 RX ORDER — DICLOFENAC SODIUM 75 MG/1
75 TABLET, DELAYED RELEASE ORAL 2 TIMES DAILY
Qty: 60 TABLET | Refills: 1 | Status: SHIPPED | OUTPATIENT
Start: 2023-10-16

## 2023-10-16 RX ORDER — CYCLOBENZAPRINE HCL 5 MG
5 TABLET ORAL 3 TIMES DAILY PRN
Qty: 30 TABLET | Refills: 2 | Status: SHIPPED | OUTPATIENT
Start: 2023-10-16

## 2023-10-16 RX ORDER — LOSARTAN POTASSIUM 50 MG/1
50 TABLET ORAL DAILY
Qty: 90 TABLET | Refills: 1 | Status: SHIPPED | OUTPATIENT
Start: 2023-10-16

## 2023-10-20 ENCOUNTER — MED REC SCAN ONLY (OUTPATIENT)
Dept: FAMILY MEDICINE CLINIC | Facility: CLINIC | Age: 53
End: 2023-10-20

## 2023-10-26 ENCOUNTER — LAB ENCOUNTER (OUTPATIENT)
Dept: LAB | Age: 53
End: 2023-10-26
Attending: FAMILY MEDICINE

## 2023-10-26 ENCOUNTER — HOSPITAL ENCOUNTER (OUTPATIENT)
Dept: ULTRASOUND IMAGING | Age: 53
Discharge: HOME | End: 2023-10-26
Attending: FAMILY MEDICINE

## 2023-10-26 ENCOUNTER — HOSPITAL ENCOUNTER (OUTPATIENT)
Dept: MAMMOGRAPHY | Age: 53
Discharge: HOME | End: 2023-10-26
Attending: FAMILY MEDICINE

## 2023-10-26 DIAGNOSIS — Z00.00 LABORATORY EXAMINATION ORDERED AS PART OF A ROUTINE GENERAL MEDICAL EXAMINATION: ICD-10-CM

## 2023-10-26 DIAGNOSIS — Z12.31 ENCOUNTER FOR SCREENING MAMMOGRAM FOR MALIGNANT NEOPLASM OF BREAST: ICD-10-CM

## 2023-10-26 DIAGNOSIS — R73.9 HYPERGLYCEMIA: ICD-10-CM

## 2023-10-26 DIAGNOSIS — E04.2 MULTINODULAR GOITER: ICD-10-CM

## 2023-10-26 LAB
ALBUMIN SERPL-MCNC: 3.8 G/DL (ref 3.4–5)
ALBUMIN/GLOB SERPL: 1 {RATIO} (ref 1–2)
ALP LIVER SERPL-CCNC: 63 U/L
ALT SERPL-CCNC: 36 U/L
ANION GAP SERPL CALC-SCNC: 3 MMOL/L (ref 0–18)
AST SERPL-CCNC: 17 U/L (ref 15–37)
BASOPHILS # BLD AUTO: 0.07 X10(3) UL (ref 0–0.2)
BASOPHILS NFR BLD AUTO: 1.4 %
BILIRUB SERPL-MCNC: 0.5 MG/DL (ref 0.1–2)
BUN BLD-MCNC: 14 MG/DL (ref 7–18)
CALCIUM BLD-MCNC: 9.3 MG/DL (ref 8.5–10.1)
CHLORIDE SERPL-SCNC: 108 MMOL/L (ref 98–112)
CHOLEST SERPL-MCNC: 211 MG/DL (ref ?–200)
CO2 SERPL-SCNC: 27 MMOL/L (ref 21–32)
CREAT BLD-MCNC: 0.9 MG/DL
EGFRCR SERPLBLD CKD-EPI 2021: 77 ML/MIN/1.73M2 (ref 60–?)
EOSINOPHIL # BLD AUTO: 0.14 X10(3) UL (ref 0–0.7)
EOSINOPHIL NFR BLD AUTO: 2.7 %
ERYTHROCYTE [DISTWIDTH] IN BLOOD BY AUTOMATED COUNT: 13.8 %
EST. AVERAGE GLUCOSE BLD GHB EST-MCNC: 123 MG/DL (ref 68–126)
FASTING PATIENT LIPID ANSWER: YES
FASTING STATUS PATIENT QL REPORTED: YES
GLOBULIN PLAS-MCNC: 3.8 G/DL (ref 2.8–4.4)
GLUCOSE BLD-MCNC: 110 MG/DL (ref 70–99)
HBA1C MFR BLD: 5.9 % (ref ?–5.7)
HCT VFR BLD AUTO: 44.4 %
HDLC SERPL-MCNC: 49 MG/DL (ref 40–59)
HGB BLD-MCNC: 14.6 G/DL
IMM GRANULOCYTES # BLD AUTO: 0.01 X10(3) UL (ref 0–1)
IMM GRANULOCYTES NFR BLD: 0.2 %
LDLC SERPL CALC-MCNC: 149 MG/DL (ref ?–100)
LYMPHOCYTES # BLD AUTO: 1.53 X10(3) UL (ref 1–4)
LYMPHOCYTES NFR BLD AUTO: 29.7 %
MCH RBC QN AUTO: 29.3 PG (ref 26–34)
MCHC RBC AUTO-ENTMCNC: 32.9 G/DL (ref 31–37)
MCV RBC AUTO: 89 FL
MONOCYTES # BLD AUTO: 0.47 X10(3) UL (ref 0.1–1)
MONOCYTES NFR BLD AUTO: 9.1 %
NEUTROPHILS # BLD AUTO: 2.94 X10 (3) UL (ref 1.5–7.7)
NEUTROPHILS # BLD AUTO: 2.94 X10(3) UL (ref 1.5–7.7)
NEUTROPHILS NFR BLD AUTO: 56.9 %
NONHDLC SERPL-MCNC: 162 MG/DL (ref ?–130)
OSMOLALITY SERPL CALC.SUM OF ELEC: 287 MOSM/KG (ref 275–295)
PLATELET # BLD AUTO: 334 10(3)UL (ref 150–450)
POTASSIUM SERPL-SCNC: 4 MMOL/L (ref 3.5–5.1)
PROT SERPL-MCNC: 7.6 G/DL (ref 6.4–8.2)
RBC # BLD AUTO: 4.99 X10(6)UL
SODIUM SERPL-SCNC: 138 MMOL/L (ref 136–145)
T3FREE SERPL-MCNC: 2.64 PG/ML (ref 2.4–4.2)
T4 FREE SERPL-MCNC: 1.2 NG/DL (ref 0.8–1.7)
TRIGL SERPL-MCNC: 75 MG/DL (ref 30–149)
TSI SER-ACNC: 0.25 MIU/ML (ref 0.36–3.74)
VLDLC SERPL CALC-MCNC: 14 MG/DL (ref 0–30)
WBC # BLD AUTO: 5.2 X10(3) UL (ref 4–11)

## 2023-10-26 PROCEDURE — 80053 COMPREHEN METABOLIC PANEL: CPT

## 2023-10-26 PROCEDURE — 84439 ASSAY OF FREE THYROXINE: CPT

## 2023-10-26 PROCEDURE — 84481 FREE ASSAY (FT-3): CPT

## 2023-10-26 PROCEDURE — 85025 COMPLETE CBC W/AUTO DIFF WBC: CPT

## 2023-10-26 PROCEDURE — 83036 HEMOGLOBIN GLYCOSYLATED A1C: CPT

## 2023-10-26 PROCEDURE — 84443 ASSAY THYROID STIM HORMONE: CPT

## 2023-10-26 PROCEDURE — 36415 COLL VENOUS BLD VENIPUNCTURE: CPT

## 2023-10-26 PROCEDURE — 76536 US EXAM OF HEAD AND NECK: CPT | Performed by: FAMILY MEDICINE

## 2023-10-26 PROCEDURE — 77067 SCR MAMMO BI INCL CAD: CPT | Performed by: FAMILY MEDICINE

## 2023-10-26 PROCEDURE — 77063 BREAST TOMOSYNTHESIS BI: CPT | Performed by: FAMILY MEDICINE

## 2023-10-26 PROCEDURE — 80061 LIPID PANEL: CPT

## 2023-12-01 ENCOUNTER — TELEPHONE (OUTPATIENT)
Dept: FAMILY MEDICINE CLINIC | Facility: CLINIC | Age: 53
End: 2023-12-01

## 2024-01-10 ENCOUNTER — OFFICE VISIT (OUTPATIENT)
Facility: CLINIC | Age: 54
End: 2024-01-10
Payer: COMMERCIAL

## 2024-01-10 VITALS — HEART RATE: 88 BPM | OXYGEN SATURATION: 96 % | DIASTOLIC BLOOD PRESSURE: 72 MMHG | SYSTOLIC BLOOD PRESSURE: 112 MMHG

## 2024-01-10 DIAGNOSIS — E05.90 SUBCLINICAL HYPERTHYROIDISM: Primary | ICD-10-CM

## 2024-01-10 DIAGNOSIS — E04.2 MULTINODULAR GOITER: ICD-10-CM

## 2024-01-10 PROCEDURE — 3074F SYST BP LT 130 MM HG: CPT | Performed by: STUDENT IN AN ORGANIZED HEALTH CARE EDUCATION/TRAINING PROGRAM

## 2024-01-10 PROCEDURE — 3078F DIAST BP <80 MM HG: CPT | Performed by: STUDENT IN AN ORGANIZED HEALTH CARE EDUCATION/TRAINING PROGRAM

## 2024-01-10 PROCEDURE — 99204 OFFICE O/P NEW MOD 45 MIN: CPT | Performed by: STUDENT IN AN ORGANIZED HEALTH CARE EDUCATION/TRAINING PROGRAM

## 2024-01-10 NOTE — PROGRESS NOTES
Endocrinology Clinic Note    Name: Elizabeth Reese    Date: 1/10/2024      HISTORY OF PRESENT ILLNESS   Elizabeth Reese is a 53 year old female with PMHx significant for hypertension and MNG who presents for consultation for abnormal TFTs    Initial HPI consult in Jan 2024  Chief Complaint   Patient presents with    New Patient     NP here to establish care. C/o hair loss, cold sensitivity, sad mood, unable to lose naomi.  Pt has no hx of being on thyroid medication.      Other     Dx: Low TSH level-referred by PCP Noé Nagel MD  -Thyroid US done 10/26/23  -TFT's done 10/26/23       PCP referred for chronic and mild subclinical hyperTH x 12 yrs per chart bx  Never needed ATD  10/2023 thyroid US with b/l subcentimeter nodules not meeting FNA criteria  10/2023 - fT4 1.2, fT3 2.64, Tsh 0.246    (+) Fhx of thyroid disease: 1st cousin with thyroid dz    Denies any supplements that contain thyroid or biotin    Endorses cold intolerance and sometimes 'skipping beats'.      REVIEW OF SYSTEMS  Ten point review of systems has been performed and is otherwise negative and/or non-contributory, except as described above.    Medications:      Current Outpatient Medications:     cyclobenzaprine 5 MG Oral Tab, Take 1 tablet (5 mg total) by mouth 3 (three) times daily as needed for Muscle spasms., Disp: 30 tablet, Rfl: 2    losartan 50 MG Oral Tab, Take 1 tablet (50 mg total) by mouth daily., Disp: 90 tablet, Rfl: 1    diclofenac 75 MG Oral Tab EC, Take 1 tablet (75 mg total) by mouth 2 (two) times daily., Disp: 60 tablet, Rfl: 1    zolpidem 5 MG Oral Tab, Take 1 tablet (5 mg total) by mouth nightly as needed for Sleep., Disp: 30 tablet, Rfl: 1     Allergies:   Allergies   Allergen Reactions    Shrimp ITCHING       Social History:   Social History     Socioeconomic History    Marital status:    Tobacco Use    Smoking status: Never    Smokeless tobacco: Never   Substance and Sexual Activity    Alcohol use: Yes      Alcohol/week: 0.0 standard drinks of alcohol    Drug use: No   Other Topics Concern    Caffeine Concern Yes    Exercise No       Medical History:   Reviewed      Surgical history:   Past Surgical History:   Procedure Laterality Date    TUBAL LIGATION  1/1/08       PHYSICAL EXAMINATION:  /72   Pulse 88   LMP 03/10/2023 (Exact Date)   SpO2 96%     CONSTITUTIONAL:  awake, alert, cooperative, no apparent distress, and appears stated age  PSYCH: normal affect  EYES:  (-) proptosis, (-) stare  ENT:  Normocephalic, atraumatic  NECK:  thyroid not enlarged and no palpable nodules  LUNGS: breathing comfortably  CARDIOVASCULAR:  regular rate   EXTREMITIES: (-) tremor      Labs:  Reviewed    Imaging:  Reviewed    ASSESSMENT/PLAN:    (E05.90) Subclinical hyperthyroidism  (primary encounter diagnosis)  (E04.2) Multinodular goiter  Plan: Free T3 (Triiodothryronine), TSH and Free T4,         Thyroid Stimulating Immunoglobulin, NM THYROID         UPTAKE AND SCAN (CPT=78014)          Pt currently (by age and history) does not have risk factors for complications of subclinical hyperthyroidism, but due to chronicity of TSH suppression and known MNG, would order thyroid uptake/scan to identify any areas of increased uptake that would put her at risk for progression to overt hyperthyroidism. Would also repeat labs and add on TSI.  If we are to start medication, would start low dose MMI.  - Pathophysiology of condition, goals of therapy,  d/w pt in detail.   - clinical significance of thyroid testing discussed  - TSH, free T4, TSI, Uptake & Scan   - no opthalmopathy on exam   - further w/u pending labs/imaging results  - if stable TFTs and workup unrevealing, would then repeat labs in 6 months    Return to Clinic in 6 months        1/10/2024  Jong Wiley MD

## 2024-02-06 ENCOUNTER — TELEPHONE (OUTPATIENT)
Dept: PHYSICAL THERAPY | Age: 54
End: 2024-02-06

## 2024-02-13 ENCOUNTER — TELEPHONE (OUTPATIENT)
Dept: PHYSICAL THERAPY | Facility: HOSPITAL | Age: 54
End: 2024-02-13

## 2024-02-14 ENCOUNTER — OFFICE VISIT (OUTPATIENT)
Dept: PHYSICAL THERAPY | Age: 54
End: 2024-02-14
Attending: FAMILY MEDICINE
Payer: COMMERCIAL

## 2024-02-14 DIAGNOSIS — M54.16 CHRONIC RADICULAR LUMBAR PAIN: Primary | ICD-10-CM

## 2024-02-14 DIAGNOSIS — G89.29 CHRONIC RADICULAR LUMBAR PAIN: Primary | ICD-10-CM

## 2024-02-14 PROCEDURE — 97110 THERAPEUTIC EXERCISES: CPT | Performed by: PHYSICAL THERAPIST

## 2024-02-14 PROCEDURE — 97161 PT EVAL LOW COMPLEX 20 MIN: CPT | Performed by: PHYSICAL THERAPIST

## 2024-02-14 NOTE — PROGRESS NOTES
SPINE EVALUATION:     Diagnosis:   Chronic radicular lumbar pain (M54.16,G89.29)       Referring Provider: Robe  Date of Evaluation:    2/14/2024    Precautions:  None Next MD visit:   none scheduled  Date of Surgery: n/a     PATIENT SUMMARY   Elizabeth Reese is a 53 year old female who presents to therapy today with complaints of worsening back and right knee.  Pain has been increased over the past few months.   There was no new injury. Reports several years of back pain.  Pain is increased in the morning. Right knee pain when going up stairs. Denies numbness/tingling. Pain > right versus left. She does not like taking pain medication   Pt describes pain level current 8/10, at best 4/10, at worst 9/10.   Current functional limitations include Difficulty on stairs,      Elizabeth describes prior level of function as independent with ADLS. She works in [employer]. She lives in a 2 level home. She is doing some stretching in the morning. Pt goals include get rid of the knee pain .  Past medical history was reviewed with Elizabeth. Significant findings include   Past Medical History:   Diagnosis Date    Abdominal pain, unspecified site     Dysuria     Personal history of urinary (tract) infection       Pt denies diplopia, dysarthria, dysphasia, dizziness, drop attacks, bowel/bladder changes, saddle anesthesia, and VANDANA LE N/T.    ASSESSMENT  Elizabeth presents to physical therapy evaluation with primary c/o right side back and knee pain. The results of the objective tests and measures show right side lumbar pain is associated with slump sitting, seated hip stretching with slumped posture., right knee pain with lateral step up/down.  Functional deficits include but are not limited to difficulty on stairs.  Signs and symptoms are consistent with diagnosis of right LE radicular pain. Will monitor right knee complaints. Recommend spinal extension approach to care and body mechanics training Pt and PT discussed evaluation  findings, pathology, POC and HEP.  Pt voiced understanding and performs HEP correctly without reported pain. Skilled Physical Therapy is medically necessary to address the above impairments and reach functional goals.     OBJECTIVE:   Observation/Posture: Unremarkable       T/L AROM: (* denotes performed with pain)  Flexion: WNL   Extension: WNL  Sidebending: NT  Rotation: R WNL; L WNL      Accessory motion: lateral pelvic shift - prone - right side lumbar pain. Pain with central PAS lower lumbar spine   Palpation: No acute soft tissue tenderness     Strength: (* denotes performed with pain)  Bilateral LE strength 5/5  Demonstrates squat without provoking pain  Lateral step up/down on 6 inch step right LE decreased pelvic control       Flexibility:   Bilateral LE ROM WNL     Special tests:   Slump - right side lumbar pain, SLR - negative     Gait: pt ambulates on level ground with normal mechanics.  Balance: SLS minimum of 20 seconds each     Today’s Treatment and Response:   Pt education was provided on exam findings, treatment diagnosis, treatment plan, expectations, and prognosis. Pt was also provided recommendations for postural corrections. Patient demonstrated right hip stretch she has been performing in sitting. Patient instructed to discontinue the stretch for now. Instructed in spinal extension exercise. Initially, increased lumbar pain. Following repeated reps, pain reduced. Patient instructed in goal of centralization of symptoms.    Patient was instructed in and issued a HEP for: prone press ups at home. Standing extension to be performed at work for symptom  reduction prn    Charges: PT Eval Low Complexity, TherEx       Total Timed Treatment: 10 min     Total Treatment Time: 45 min     Based on clinical rationale and outcome measures, this evaluation involved Low Complexity decision making   PLAN OF CARE:    Goals: (to be met in 8 visits)   No difficulty on stairs   Patient will be able to lift minimum  of 20 pounds - no pain   Patient will be able to walk minimum of 20 minutes without pain   Patient will be able to perform ADLS without pain     Frequency / Duration: Patient will be seen for 2 x/week or a total of 8 visits over a 90 day period. Treatment will include: Therapeutic Exercise, neuro re-ed     Education or treatment limitation: None  Rehab Potential:good    Oswestry Disability Index Score  Score: 44 % (2/14/2024 11:08 AM)      Patient/Family/Caregiver was advised of these findings, precautions, and treatment options and has agreed to actively participate in planning and for this course of care.    Thank you for your referral. Please co-sign or sign and return this letter via fax as soon as possible to [phone]. If you have any questions, please contact me at Dept: [phone]    Sincerely,  Electronically signed by therapist: Ivette Patel PT    Physician's certification required: Yes  I certify the need for these services furnished under this plan of treatment and while under my care.    X___________________________________________________ Date____________________    Certification From: 2/14/2024  To:5/14/2024

## 2024-02-20 ENCOUNTER — OFFICE VISIT (OUTPATIENT)
Dept: PHYSICAL THERAPY | Age: 54
End: 2024-02-20
Attending: FAMILY MEDICINE
Payer: COMMERCIAL

## 2024-02-20 PROCEDURE — 97110 THERAPEUTIC EXERCISES: CPT | Performed by: PHYSICAL THERAPIST

## 2024-02-20 NOTE — PROGRESS NOTES
Dx: Chronic radicular lumbar pain (M54.16,G89.29)            Authorized # of Visits:  8  Fall Risk: standard         Precautions: n/a             Subjective:   Bilat back pain. Right knee pain climbing stairs   Current Pain Rating: 3/10  Objective:   Initiated hip hinge training. With repeated reps, performance improved   HEP additions as noted  Lumbar pain provoked with LTR only, following transverse plane isometrics, there was reduction in pain     Assessment:   Patient tolerated exercise well overall. She will benefit from additional body mechanics training     Plan:   PT 2x/wk progressive exercise, body mechanics training     Date: 2/20/2024  Tx#: 2/8 Date:   Tx#: 3/ Date:   Tx#: 4/ Date:   Tx#: 5/ Date:   Tx#: 6/ Date:   Tx#: 7/ Date:   Tx#: 8/   TherEx 45 TherEx TherEx TherEx TherEx TherEx TherEx   Treadmill 8 minutes         Shuttle 5 bands squats 20 reps          Shuttle single leg squats  4 bands   Right = 30  Left =  30          Quad stretch 3 reps x 20-30 seconds each          Hip hinge training standing/quadruped          Lateral walk blue band below knees (HEP)         SL thoracic rotation (HEP)         Ramon knee fallouts (HEP)         Transverse plane isometrics hooklying - decreased pain with LTR after isometrics              Charges: TherEx 3        Total Timed Treatment: 45  min  Total Treatment Time: 45 min

## 2024-02-28 ENCOUNTER — OFFICE VISIT (OUTPATIENT)
Dept: PHYSICAL THERAPY | Age: 54
End: 2024-02-28
Attending: FAMILY MEDICINE
Payer: COMMERCIAL

## 2024-02-28 PROCEDURE — 97110 THERAPEUTIC EXERCISES: CPT | Performed by: PHYSICAL THERAPIST

## 2024-02-28 NOTE — PROGRESS NOTES
Dx: Chronic radicular lumbar pain (M54.16,G89.29)            Authorized # of Visits:  8  Fall Risk: standard         Precautions: n/a             Subjective:   There is some morning back pain, no pain the past 3 days. Right knee is improved but still painful when going up the stairs to get on the bus   Current Pain Ratin/10   Objective:   Patient demonstrated/ lift/carry minimum of 20 pounds - no pain proper mechanics   Overhead lift minimum of 6 pounds - no pain   HEP additions as noted   Right knee pain is provoked with backward lunge with TRX and forward lunge unsupported with bilat reach to knee level.     Assessment:   Patient is doing well in terms of lumbar pain. Right knee pain is provoked with increased joint loading. HEP additions address the pain. Patient to work in pain free range.     Plan:   PT 2x/wk progressive exercise, body mechanics training     Date: 2024  Tx#:  Date: 2024   Tx#: 3/8 Date:   Tx#: 4/ Date:   Tx#: 5/ Date:   Tx#: 6/ Date:   Tx#: 7/ Date:   Tx#: 8/   TherEx 45 TherEx TherEx TherEx TherEx TherEx TherEx   Treadmill 8 minutes Elliptical 6 minutes         Shuttle 5 bands squats 20 reps  TRX squat training - mild right knee pain with shoes on, no pain with shoes off         Shuttle single leg squats  4 bands   Right = 30  Left =  30  TRX posterior lunge - right knee pain with right LE loading         Quad stretch 3 reps x 20-30 seconds each  HEP additions   Forward step with same side bilat reach, forward step with bilat reach to knee level. Patient to work in pain free ROM. Reach to thigh versus knee level if needed         Hip hinge training standing/quadruped  Hip hinge training standing and single leg balance         Lateral walk blue band below knees (HEP) Single leg balance right and left LE > 4 reps each on floor and airex pad - no knee pain        SL thoracic rotation (HEP) Step matrix with 4 pounds and 6 pounds 30 seconds x 1 each. Instructed with 5 pounds right  and left UE > 20 seconds         Ramon knee fallouts (HEP) Knees to chest with SB 30 reps        Transverse plane isometrics hooklying - decreased pain with LTR after isometrics  Hip/knee flex/exten no touch 10 reps each          Bridge 20 reps         LTR initial reps pain with rotation left - transverse plane isometrics, isometric hip abduction bilat improved pain              Charges: TherEx 3        Total Timed Treatment: 45  min  Total Treatment Time: 45 min

## 2024-03-06 ENCOUNTER — OFFICE VISIT (OUTPATIENT)
Dept: PHYSICAL THERAPY | Age: 54
End: 2024-03-06
Attending: FAMILY MEDICINE
Payer: COMMERCIAL

## 2024-03-06 ENCOUNTER — TELEPHONE (OUTPATIENT)
Dept: PHYSICAL THERAPY | Age: 54
End: 2024-03-06

## 2024-03-06 ENCOUNTER — TELEPHONE (OUTPATIENT)
Dept: PHYSICAL THERAPY | Facility: HOSPITAL | Age: 54
End: 2024-03-06

## 2024-03-06 PROCEDURE — 97110 THERAPEUTIC EXERCISES: CPT | Performed by: PHYSICAL THERAPIST

## 2024-03-06 NOTE — PROGRESS NOTES
Dx: Chronic radicular lumbar pain (M54.16,G89.29)            Authorized # of Visits:  8  Fall Risk: standard         Precautions: n/a             Subjective:   Pain right knee with getting onto bus persists, back pain is better   Current Pain Rating: 3/10  Objective:   Treatment as noted   Mild right lateral hip pain with LTR moving left. Pain decreased following prone press ups   Bilateral knee ROM WNL.   HEP addition as noted     Assessment:   Right side lumbar pain continues to respond to extension approach. Patient is making good progress towards therapy goals. She does require some verbal cues for lumbar positioning. Difficult to determine if right knee pain is associated with lumbar spine. Dicussed progress with patient, she will on vacation the next 2 weeks. She would like to continue PT. In agreement with plan to continue PT to provide additional patient education and advance exercise as appropriate.       Plan:   Continue PT when patient returns from vacation     Date: 2/20/2024  Tx#: 2/8 Date: 2/28/2024   Tx#: 3/8 Date: 3/6/2024   Tx#: 4/8 Date:   Tx#: 5/ Date:   Tx#: 6/ Date:   Tx#: 7/ Date:   Tx#: 8/   TherEx 45 TherEx TherEx TherEx TherEx TherEx TherEx   Treadmill 8 minutes Elliptical 6 minutes  Elliptical > 5 minutes        Shuttle 5 bands squats 20 reps  TRX squat training - mild right knee pain with shoes on, no pain with shoes off  [employer] side step, forward gait        Shuttle single leg squats  4 bands   Right = 30  Left =  30  TRX posterior lunge - right knee pain with right LE loading  Shuttle 4 bands squats reps to fatigue        Quad stretch 3 reps x 20-30 seconds each  HEP additions   Forward step with same side bilat reach, forward step with bilat reach to knee level. Patient to work in pain free ROM. Reach to thigh versus knee level if needed  Hip adduction 20 reps each (HEP)       Hip hinge training standing/quadruped  Hip hinge training standing and single leg balance  LTR - right  lateral back pain > left. Prone press ups performed no pain with LTR       Lateral walk blue band below knees (HEP) Single leg balance right and left LE > 4 reps each on floor and airex pad - no knee pain Leg load cues to load opposite leg prior to motion       SL thoracic rotation (HEP) Step matrix with 4 pounds and 6 pounds 30 seconds x 1 each. Instructed with 5 pounds right and left UE > 20 seconds  20 pound lift/carry        Ramon knee fallouts (HEP) Knees to chest with SB 30 reps Figure four stretch right and left LE x 4 reps 20 second hold manual hold to maintain pelvics position with right LE stretch        Transverse plane isometrics hooklying - decreased pain with LTR after isometrics  Hip/knee flex/exten no touch 10 reps each          Bridge 20 reps         LTR initial reps pain with rotation left - transverse plane isometrics, isometric hip abduction bilat improved pain              Charges: TherEx 3        Total Timed Treatment: 45  min  Total Treatment Time: 45 min

## 2024-05-25 DIAGNOSIS — I10 PRIMARY HYPERTENSION: ICD-10-CM

## 2024-05-25 NOTE — TELEPHONE ENCOUNTER
Medication(s) to Refill:   Requested Prescriptions     Pending Prescriptions Disp Refills    LOSARTAN 50 MG Oral Tab [Pharmacy Med Name: LOSARTAN POTASSIUM 50 MG TAB] 90 tablet 1     Sig: TAKE 1 TABLET BY MOUTH EVERY DAY         Reason for Medication Refill being sent to Provider / Reason Protocol Failed:  [] 90 day refill has already been granted  [] Blood Pressure out of range  [] Labs Abnormal/over due  [] Medication not previously prescribed by Provider  [] Non-Protocol Medication  [] Controlled Substance   [] Due for appointment- no future appointment scheduled  [] No Follow up specified      Last Time Medication was Filled:  10/16/23      Last Office Visit with PCP: 10/16/23    When Patient was Due Back to the Office:  5 weeks  (from when PCP last addressed condition)    Future Appointments:  No future appointments.      Last Blood Pressures:  BP Readings from Last 2 Encounters:   01/10/24 112/72   10/16/23 108/78         Action taken:  [] Refill approved per protocol  [] Routing to provider for approval

## 2024-05-28 RX ORDER — LOSARTAN POTASSIUM 50 MG/1
50 TABLET ORAL DAILY
Qty: 90 TABLET | Refills: 1 | Status: SHIPPED | OUTPATIENT
Start: 2024-05-28

## 2024-06-26 ENCOUNTER — TELEPHONE (OUTPATIENT)
Dept: FAMILY MEDICINE CLINIC | Facility: CLINIC | Age: 54
End: 2024-06-26

## 2024-06-26 ENCOUNTER — OFFICE VISIT (OUTPATIENT)
Dept: FAMILY MEDICINE CLINIC | Facility: CLINIC | Age: 54
End: 2024-06-26

## 2024-06-26 VITALS
OXYGEN SATURATION: 96 % | HEIGHT: 61 IN | DIASTOLIC BLOOD PRESSURE: 68 MMHG | BODY MASS INDEX: 30.55 KG/M2 | HEART RATE: 77 BPM | WEIGHT: 161.81 LBS | RESPIRATION RATE: 18 BRPM | SYSTOLIC BLOOD PRESSURE: 100 MMHG

## 2024-06-26 DIAGNOSIS — Z12.31 ENCOUNTER FOR SCREENING MAMMOGRAM FOR MALIGNANT NEOPLASM OF BREAST: ICD-10-CM

## 2024-06-26 DIAGNOSIS — G89.29 CHRONIC RADICULAR LUMBAR PAIN: Primary | ICD-10-CM

## 2024-06-26 DIAGNOSIS — H57.9 EYE EXAM ABNORMAL: ICD-10-CM

## 2024-06-26 DIAGNOSIS — M25.561 ACUTE PAIN OF RIGHT KNEE: ICD-10-CM

## 2024-06-26 DIAGNOSIS — E78.00 ELEVATED CHOLESTEROL: ICD-10-CM

## 2024-06-26 DIAGNOSIS — R73.9 HYPERGLYCEMIA: ICD-10-CM

## 2024-06-26 DIAGNOSIS — M54.16 CHRONIC RADICULAR LUMBAR PAIN: Primary | ICD-10-CM

## 2024-06-26 PROCEDURE — 3074F SYST BP LT 130 MM HG: CPT | Performed by: FAMILY MEDICINE

## 2024-06-26 PROCEDURE — 3078F DIAST BP <80 MM HG: CPT | Performed by: FAMILY MEDICINE

## 2024-06-26 PROCEDURE — 99214 OFFICE O/P EST MOD 30 MIN: CPT | Performed by: FAMILY MEDICINE

## 2024-06-26 PROCEDURE — 3008F BODY MASS INDEX DOCD: CPT | Performed by: FAMILY MEDICINE

## 2024-06-26 RX ORDER — DICLOFENAC SODIUM 75 MG/1
75 TABLET, DELAYED RELEASE ORAL 2 TIMES DAILY
Qty: 60 TABLET | Refills: 1 | Status: SHIPPED | OUTPATIENT
Start: 2024-06-26

## 2024-06-26 NOTE — TELEPHONE ENCOUNTER
FMLA paper work received. Fee collected. Pt will  form upon completion. Forms have been scanned & emails to self/ forwarded tp Froms dept. Originals sent to forms, corporate via interoffice mail.

## 2024-06-26 NOTE — PROGRESS NOTES
[facility] Family Medicine Office Note  Chief Complaint:   Chief Complaint   Patient presents with    Referral     Pt is coming in for another script for PT, Pt states that she continues to have ow back , right hip and right knee pain,        HPI:   This is a 54 year old female coming in for    Chronic radicular lumbar pain - needs new referral for PT. Sx slightly better however started to have pain in R hip and knee. Has been using diclofenac, flexeril PRN. Denies any red flag sx - no saddle anesthesia. No bowel or bladder incontinence. No recent falls.     Past Medical History:    Abdominal pain, unspecified site    Dysuria    Personal history of urinary (tract) infection     Past Surgical History:   Procedure Laterality Date    Tubal ligation  1/1/08     Social History:  Social History     Socioeconomic History    Marital status:    Tobacco Use    Smoking status: Never    Smokeless tobacco: Never   Substance and Sexual Activity    Alcohol use: Yes     Alcohol/week: 0.0 standard drinks of alcohol    Drug use: No   Other Topics Concern    Caffeine Concern Yes    Exercise No     Family History:  Family History   Problem Relation Age of Onset    Breast Cancer Maternal Aunt 62        early 60's    Other (colon cancer[other]) Other         family hx    Other (emphysema[other]) Father     Other (ulcer[other]) Father         gastric    Hypertension Mother      Allergies:  Allergies   Allergen Reactions    Shrimp ITCHING     Current Meds:  Current Outpatient Medications   Medication Sig Dispense Refill    diclofenac 75 MG Oral Tab EC Take 1 tablet (75 mg total) by mouth 2 (two) times daily. 60 tablet 1    LOSARTAN 50 MG Oral Tab TAKE 1 TABLET BY MOUTH EVERY DAY 90 tablet 1    cyclobenzaprine 5 MG Oral Tab Take 1 tablet (5 mg total) by mouth 3 (three) times daily as needed for Muscle spasms. 30 tablet 2    zolpidem 5 MG Oral Tab Take 1 tablet (5 mg total) by mouth nightly as needed for Sleep. 30 tablet 1       Counseling given: Not Answered       REVIEW OF SYSTEMS:   Review of Systems   Constitutional: Negative.    HENT: Negative.     Eyes: Negative.    Respiratory: Negative.     Cardiovascular: Negative.    Gastrointestinal: Negative.    Endocrine: Negative.    Genitourinary: Negative.    Musculoskeletal:  Positive for arthralgias and back pain.   Skin: Negative.    Neurological: Negative.    Psychiatric/Behavioral: Negative.          EXAM:   /68   Pulse 77   Resp 18   Ht 5' 1\" (1.549 m)   Wt 161 lb 12.8 oz (73.4 kg)   SpO2 96%   BMI 30.57 kg/m²  Estimated body mass index is 30.57 kg/m² as calculated from the following:    Height as of this encounter: 5' 1\" (1.549 m).    Weight as of this encounter: 161 lb 12.8 oz (73.4 kg).   Vital signs reviewed.Appears stated age, well groomed.  Physical Exam     General: alert, appears stated age, and cooperative  Head: Normocephalic, without obvious abnormality, atraumatic  Back: low back pain on R side; ROM limited. SLR positive on R side.  Extremities: Tenderness to palpation of R knee. ROM wnl, strength wnl. No swelling, erythema, effusion, joint laxity. Hip strength wnl b/l.   Pulses: 2+ and symmetric  Skin: Negative  Lymph Nodes: No LAD  Neurologic: Grossly normal  ASSESSMENT AND PLAN:   1. Chronic radicular lumbar pain  Some improvement, might benefit from pain mgmt clinic evaluation. Can continue PT, conservative measures for now. No red flag sx. F/u 3-4mo or sooner prn.   - Pain Management Referral - In Network  - diclofenac 75 MG Oral Tab EC; Take 1 tablet (75 mg total) by mouth 2 (two) times daily.  Dispense: 60 tablet; Refill: 1  - Physical Therapy Referral - [facility] Location    2. Acute pain of right knee  Suspect 2/2 overcompensation d/t back pain. Continue conservative treatment as above.     3. Elevated cholesterol  Continue low fat diet, regular exercise. Due for lipid panel   - Lipid Panel; Future    4. Hyperglycemia  Counseled on low carb diet,  regular exercise. Due for labs.   - Hemoglobin A1C; Future  - Comp Metabolic Panel (14); Future  - Lipid Panel; Future    5. Encounter for screening mammogram for malignant neoplasm of breast  - [facility] JOSIE 2D+3D SCREENING BILAT (CPT=77067/98575); Future    6. Eye exam abnormal  Needs ophthalmology referral.   - Ophthalmology Referral - External      Meds & Refills for this Visit:  Requested Prescriptions     Signed Prescriptions Disp Refills    diclofenac 75 MG Oral Tab EC 60 tablet 1     Sig: Take 1 tablet (75 mg total) by mouth 2 (two) times daily.       Health Maintenance:  Health Maintenance Due   Topic Date Due    Colorectal Cancer Screening  Never done    Pap Smear  09/11/2022    Zoster Vaccines (2 of 2) 10/14/2022    COVID-19 Vaccine (4 - 2023-24 season) 09/01/2023    Annual Depression Screening  01/01/2024       Patient/Caregiver Education: Patient/Caregiver Education: There are no barriers to learning. Medical education done.   Outcome: Patient verbalizes understanding. Patient is notified to call with any questions, complications, allergies, or worsening or changing symptoms.  Patient is to call with any side effects or complications from the treatments as a result of today.     Problem List:  Patient Active Problem List   Diagnosis    Hematest positive stools    Family history of colon cancer    Multinodular goiter    Migraine without aura and without status migrainosus, not intractable    Primary hypertension    Subclinical hyperthyroidism

## 2024-06-28 NOTE — TELEPHONE ENCOUNTER
Family Medical Leave Act forms received and logged for processing. No Release of Information - patient sent [employer] message.

## 2024-07-09 NOTE — TELEPHONE ENCOUNTER
Called patient to obtain details for Family Medical Leave Act. Left voice message to call [phone].

## 2024-07-09 NOTE — TELEPHONE ENCOUNTER
Pt's Son came in to collect forms, Son was provided the forms department phone number & instructed to contact forms department to answer questions that are needed for form completion.

## 2024-07-12 NOTE — TELEPHONE ENCOUNTER
Called patient and pts son X 2 to obtain details for Family Medical Leave Act. Left VM to call [phone]. Placed forms in PTS ON HOLD folder.

## 2024-07-19 NOTE — TELEPHONE ENCOUNTER
PTReturned to [employer] [facility] to  her forms and was informed that she was contacted by Forms dept. For missing forms. Pt did pay the fee for forms completion initially. Auth to use & disclose info form has been filled out, inter office mailed and scanned via email. Forwarded to forms.

## 2024-07-25 NOTE — TELEPHONE ENCOUNTER
Release of Information received no fax number. Sent [employer] message for Release of Information.

## 2024-09-17 ENCOUNTER — HOSPITAL ENCOUNTER (OUTPATIENT)
Dept: GENERAL RADIOLOGY | Age: 54
Discharge: HOME | End: 2024-09-17
Attending: PHYSICIAN ASSISTANT
Payer: COMMERCIAL

## 2024-09-17 ENCOUNTER — OFFICE VISIT (OUTPATIENT)
Dept: FAMILY MEDICINE CLINIC | Facility: CLINIC | Age: 54
End: 2024-09-17
Payer: COMMERCIAL

## 2024-09-17 VITALS
HEIGHT: 61 IN | TEMPERATURE: 98 F | RESPIRATION RATE: 16 BRPM | HEART RATE: 88 BPM | WEIGHT: 158 LBS | BODY MASS INDEX: 29.83 KG/M2 | DIASTOLIC BLOOD PRESSURE: 82 MMHG | OXYGEN SATURATION: 96 % | SYSTOLIC BLOOD PRESSURE: 112 MMHG

## 2024-09-17 DIAGNOSIS — R05.1 ACUTE COUGH: ICD-10-CM

## 2024-09-17 DIAGNOSIS — J40 SINOBRONCHITIS: Primary | ICD-10-CM

## 2024-09-17 DIAGNOSIS — J32.9 SINOBRONCHITIS: Primary | ICD-10-CM

## 2024-09-17 PROCEDURE — 99213 OFFICE O/P EST LOW 20 MIN: CPT | Performed by: PHYSICIAN ASSISTANT

## 2024-09-17 PROCEDURE — 71046 X-RAY EXAM CHEST 2 VIEWS: CPT | Performed by: PHYSICIAN ASSISTANT

## 2024-09-17 PROCEDURE — 3008F BODY MASS INDEX DOCD: CPT | Performed by: PHYSICIAN ASSISTANT

## 2024-09-17 PROCEDURE — 3079F DIAST BP 80-89 MM HG: CPT | Performed by: PHYSICIAN ASSISTANT

## 2024-09-17 PROCEDURE — 3074F SYST BP LT 130 MM HG: CPT | Performed by: PHYSICIAN ASSISTANT

## 2024-09-17 RX ORDER — ALBUTEROL SULFATE 90 UG/1
INHALANT RESPIRATORY (INHALATION)
Qty: 1 EACH | Refills: 0 | Status: SHIPPED | OUTPATIENT
Start: 2024-09-17

## 2024-09-17 RX ORDER — BENZONATATE 200 MG/1
200 CAPSULE ORAL 3 TIMES DAILY PRN
Qty: 21 CAPSULE | Refills: 0 | Status: SHIPPED | OUTPATIENT
Start: 2024-09-17 | End: 2024-09-24

## 2024-09-17 NOTE — PROGRESS NOTES
CHIEF COMPLAINT:     Chief Complaint   Patient presents with    Cough     Cough x 1 wk, fever high of 102, joint pain, chills, congestion, wheezing  No recent Covid test  OTC Mucinex day/night   Recent exposure to Covid from son       HPI:   Elizabeth Reese is a 54 year old female who presents with complaints of cough for one week.  The patient reports the first 3 days of illness the patient had a fever.   Tmax of 102.  The patient has not had a fever in 4 days.  The patient continues to the nasal congestion, nasal drainage, productive cough, and wheezing.  The patient denies ear pain and sore throat.  The patient reports her son tested positive for COVID last week, but she never tested.     Current Outpatient Medications   Medication Sig Dispense Refill    amoxicillin clavulanate 875-125 MG Oral Tab Take 1 tablet by mouth 2 (two) times daily for 10 days. 20 tablet 0    albuterol (PROAIR HFA) 108 (90 Base) MCG/ACT Inhalation Aero Soln 2 puffs every 4-6 hours prn wheezing or shortness of breath 1 each 0    benzonatate 200 MG Oral Cap Take 1 capsule (200 mg total) by mouth 3 (three) times daily as needed for cough. 21 capsule 0    diclofenac 75 MG Oral Tab EC Take 1 tablet (75 mg total) by mouth 2 (two) times daily. 60 tablet 1    LOSARTAN 50 MG Oral Tab TAKE 1 TABLET BY MOUTH EVERY DAY 90 tablet 1    cyclobenzaprine 5 MG Oral Tab Take 1 tablet (5 mg total) by mouth 3 (three) times daily as needed for Muscle spasms. 30 tablet 2    zolpidem 5 MG Oral Tab Take 1 tablet (5 mg total) by mouth nightly as needed for Sleep. 30 tablet 1      Past Medical History:    Abdominal pain, unspecified site    Dysuria    Personal history of urinary (tract) infection      Social History:  Social History     Socioeconomic History    Marital status:    Tobacco Use    Smoking status: Never    Smokeless tobacco: Never   Substance and Sexual Activity    Alcohol use: Yes     Alcohol/week: 0.0 standard drinks of alcohol    Drug use:  No   Other Topics Concern    Caffeine Concern Yes    Exercise No        REVIEW OF SYSTEMS:   GENERAL: Denies fever, chills,weight change, decreased appetite  SKIN: Denies rashes, skin wounds or ulcers.  EYES: Denies blurred vision or double vision  HENT: See HPI  CHEST: Denies chest pain, or palpitations  LUNGS: See HPI  GI: Denies abdominal pain, N/V/C/D.   MUSCULOSKELETAL: no arthralgia or swollen joints  LYMPH:  Denies lymphadenopathy  NEURO: Denies headaches or lightheadedness      EXAM:   /82   Pulse 88   Temp 98.2 °F (36.8 °C)   Resp 16   Ht 5' 1\" (1.549 m)   Wt 158 lb (71.7 kg)   SpO2 96%   BMI 29.85 kg/m²   GENERAL: well developed, well nourished,in no apparent distress, cooperative   SKIN: no rashes, nosuspicious lesions, no abnormal pigmentation  HEAD: atraumatic, normocephalic  EYES: EOM intact, PERRL.  Conjunctiva normal.  Cornea clear.  Lid margins normal.  No active drainage.  EARS: Right TM normal, no bulging, no retraction, no fluid, bony landmarks normal.  Left TM normal, no bulging, no retraction, no fluid, bony landmarks normal.    NOSE: nostrils patent, + discharge, nasal mucosa pink and not inflamed.  No sinus tenderness.  THROAT: oral mucosa pink, moist and intact. No visible dental caries. Posterior pharynx without erythema or exudates.  NECK: supple, non-tender.  LUNGS: Coarse breath sounds bilaterally. Breathing is non labored.  CARDIO: RRR without murmur  GI: No visible scars, or masses. BS's present x4. No palpable masses or hepatosplenomegaly.  Non tender.  No guarding or rebound tenderness  EXTREMITIES: no cyanosis, clubbing or edema.  Homans NEG.  Dorsalis Pedis 2+.  LYMPH:  No lymphadenopathy.    NEURO: A&Ox3.  CN II-XII intact.  No focal deficits.  Coordination and Gait normal.  Kernig and Brudzinski's are negative.      PROCEDURE:  XR CHEST PA + LAT CHEST (CPT=71046)     INDICATIONS:  R05.1 Acute cough     COMPARISON:  None.     TECHNIQUE:  PA and lateral chest  radiographs were obtained.     PATIENT STATED HISTORY: (As transcribed by Technologist)  Patient states she has had a wet, productive cough since last week and wheezing since yesterday. She says her cough produces a lot of yellow phlegm.         FINDINGS:    Cardiac size and pulmonary vasculature are within normal limits. No pleural effusions. No pneumothorax.  No acute pulmonary findings.  Degenerative changes of the thoracic spine.                   Impression   CONCLUSION:  No acute pulmonary findings.        LOCATION:  [facility]        Dictated by (CST): Jayro Coreas MD on 9/17/2024 at 11:44 AM      Finalized by (CST): Jayro Coreas MD on 9/17/2024 at 11:44 AM         ASSESSMENT AND PLAN:     ASSESSMENT:  Encounter Diagnosis   Name Primary?    Sinobronchitis Yes       PLAN:    Patient Instructions   Augmentin  Albuterol  Tessalon  Follow up with PCP   If worse seek treatment at IC or ER

## 2024-09-18 ENCOUNTER — OFFICE VISIT (OUTPATIENT)
Dept: FAMILY MEDICINE CLINIC | Facility: CLINIC | Age: 54
End: 2024-09-18
Payer: COMMERCIAL

## 2024-09-18 VITALS
HEART RATE: 70 BPM | SYSTOLIC BLOOD PRESSURE: 130 MMHG | OXYGEN SATURATION: 96 % | HEIGHT: 61 IN | BODY MASS INDEX: 30.21 KG/M2 | DIASTOLIC BLOOD PRESSURE: 80 MMHG | WEIGHT: 160 LBS

## 2024-09-18 DIAGNOSIS — J32.9 SINOBRONCHITIS: Primary | ICD-10-CM

## 2024-09-18 DIAGNOSIS — R11.0 NAUSEA: ICD-10-CM

## 2024-09-18 DIAGNOSIS — J40 SINOBRONCHITIS: Primary | ICD-10-CM

## 2024-09-18 PROCEDURE — 3079F DIAST BP 80-89 MM HG: CPT | Performed by: FAMILY MEDICINE

## 2024-09-18 PROCEDURE — 99214 OFFICE O/P EST MOD 30 MIN: CPT | Performed by: FAMILY MEDICINE

## 2024-09-18 PROCEDURE — 3008F BODY MASS INDEX DOCD: CPT | Performed by: FAMILY MEDICINE

## 2024-09-18 PROCEDURE — 3075F SYST BP GE 130 - 139MM HG: CPT | Performed by: FAMILY MEDICINE

## 2024-09-18 RX ORDER — ONDANSETRON 4 MG/1
4 TABLET, ORALLY DISINTEGRATING ORAL EVERY 8 HOURS PRN
Qty: 10 TABLET | Refills: 0 | Status: SHIPPED | OUTPATIENT
Start: 2024-09-18

## 2024-09-18 NOTE — PROGRESS NOTES
[facility] Family Medicine Office Note  Chief Complaint:   Chief Complaint   Patient presents with    Cough     Has been sick since 9/10/24   Started off with a dry cough and flu like symptoms for 3 days , feeling very weak and started to wheeze on Monday . Now she has green and yellow mucus . Went to the walk in clinic yesterday needs fmla forms filled out .  Also wants you to review her xray   Also is having diarrhea and vomiting from amoxicillin        HPI:   This is a 54 year old female coming in for [facility] f/u  She was seen yesterday in walk-in clinic for 1 week history of flu like symptoms.  She had fevers last week with Tmax of 102, myalgias, fatigue.  These sx have resolved but ontinues to complain of nasal congestion, rhinorrhea, productive cough and wheezing. Her son tested positive for COVID last week however she never tested.  Seen in [facility] yesterday; she was prescribed Augmentin, albuterol, Tessalon Perles yesterday.  She had a chest x-ray which was unremarkable. She reports some improvement with cough but remains consistent. She has been compliant with medications. She has been having some nausea, loose stools when taking the Augmentin. No vomiting. No further fevers. No chest pains. Tolerating PO intake.     Past Medical History:    Abdominal pain, unspecified site    Dysuria    Personal history of urinary (tract) infection     Past Surgical History:   Procedure Laterality Date    Tubal ligation  1/1/08     Social History:  Social History     Socioeconomic History    Marital status:    Tobacco Use    Smoking status: Never    Smokeless tobacco: Never   Substance and Sexual Activity    Alcohol use: Yes     Alcohol/week: 0.0 standard drinks of alcohol    Drug use: No   Other Topics Concern    Caffeine Concern Yes    Exercise No     Family History:  Family History   Problem Relation Age of Onset    Breast Cancer Maternal Aunt 62        early 60's    Other (colon cancer[other]) Other          family hx    Other (emphysema[other]) Father     Other (ulcer[other]) Father         gastric    Hypertension Mother      Allergies:  Allergies   Allergen Reactions    Shrimp ITCHING     Current Meds:  Current Outpatient Medications   Medication Sig Dispense Refill    ondansetron 4 MG Oral Tablet Dispersible Take 1 tablet (4 mg total) by mouth every 8 (eight) hours as needed for Nausea. 10 tablet 0    amoxicillin clavulanate 875-125 MG Oral Tab Take 1 tablet by mouth 2 (two) times daily for 10 days. 20 tablet 0    albuterol (PROAIR HFA) 108 (90 Base) MCG/ACT Inhalation Aero Soln 2 puffs every 4-6 hours prn wheezing or shortness of breath 1 each 0    benzonatate 200 MG Oral Cap Take 1 capsule (200 mg total) by mouth 3 (three) times daily as needed for cough. 21 capsule 0    diclofenac 75 MG Oral Tab EC Take 1 tablet (75 mg total) by mouth 2 (two) times daily. 60 tablet 1    LOSARTAN 50 MG Oral Tab TAKE 1 TABLET BY MOUTH EVERY DAY 90 tablet 1    cyclobenzaprine 5 MG Oral Tab Take 1 tablet (5 mg total) by mouth 3 (three) times daily as needed for Muscle spasms. 30 tablet 2    zolpidem 5 MG Oral Tab Take 1 tablet (5 mg total) by mouth nightly as needed for Sleep. 30 tablet 1      Counseling given: Not Answered       REVIEW OF SYSTEMS:   Review of Systems   A comprehensive 10 point review of systems was completed.  Pertinent positives and negatives noted in the the HPI.    EXAM:   /80   Pulse 70   Ht 5' 1\" (1.549 m)   Wt 160 lb (72.6 kg)   SpO2 96%   BMI 30.23 kg/m²  Estimated body mass index is 30.23 kg/m² as calculated from the following:    Height as of this encounter: 5' 1\" (1.549 m).    Weight as of this encounter: 160 lb (72.6 kg).   Vital signs reviewed.Appears stated age, well groomed.  Physical Exam  Vitals and nursing note reviewed.   Constitutional:       Appearance: Normal appearance.   HENT:      Head: Normocephalic and atraumatic.      Nose: Congestion and rhinorrhea present.      Mouth/Throat:       Mouth: Mucous membranes are moist.      Pharynx: No oropharyngeal exudate or posterior oropharyngeal erythema.   Eyes:      Pupils: Pupils are equal, round, and reactive to light.   Cardiovascular:      Rate and Rhythm: Normal rate and regular rhythm.      Pulses: Normal pulses.      Heart sounds: Normal heart sounds. No murmur heard.  Pulmonary:      Effort: Pulmonary effort is normal. No respiratory distress.      Breath sounds: Normal breath sounds. No wheezing or rhonchi.   Musculoskeletal:      Right lower leg: No edema.      Left lower leg: No edema.   Lymphadenopathy:      Cervical: No cervical adenopathy.   Skin:     Findings: No rash.   Neurological:      General: No focal deficit present.      Mental Status: She is alert and oriented to person, place, and time.        ASSESSMENT AND PLAN:   1. Sinobronchitis  Remains symptomatic however she just recently started abx. Reviewed CXR, wnl reassuring. Advised to complete abx course. Continue supportive care measures. Continue to stay well hydrated, push fluids. Completed [facility] paperwork; return to work Monday if fever free 24 hrs and symptoms improving. Reviewed hygiene, contact precautions. F/u 1wk PRN.     2. Nausea  Likely 2/2 abx, can use zofran prn. Advised to take abx w/ meals.   - ondansetron 4 MG Oral Tablet Dispersible; Take 1 tablet (4 mg total) by mouth every 8 (eight) hours as needed for Nausea.  Dispense: 10 tablet; Refill: 0      Meds & Refills for this Visit:  Requested Prescriptions     Signed Prescriptions Disp Refills    ondansetron 4 MG Oral Tablet Dispersible 10 tablet 0     Sig: Take 1 tablet (4 mg total) by mouth every 8 (eight) hours as needed for Nausea.       Health Maintenance:  Health Maintenance Due   Topic Date Due    Colorectal Cancer Screening  Never done    Pap Smear  09/11/2022    Zoster Vaccines (2 of 2) 10/14/2022    Annual Depression Screening  01/01/2024    COVID-19 Vaccine (4 - 2023-24 season) 09/01/2024    Annual Physical   10/16/2024    Mammogram  10/26/2024       Patient/Caregiver Education: Patient/Caregiver Education: There are no barriers to learning. Medical education done.   Outcome: Patient verbalizes understanding. Patient is notified to call with any questions, complications, allergies, or worsening or changing symptoms.  Patient is to call with any side effects or complications from the treatments as a result of today.     Problem List:  Patient Active Problem List   Diagnosis    Hematest positive stools    Family history of colon cancer    Multinodular goiter    Migraine without aura and without status migrainosus, not intractable    Primary hypertension    Subclinical hyperthyroidism

## 2024-09-29 ENCOUNTER — OFFICE VISIT (OUTPATIENT)
Dept: FAMILY MEDICINE CLINIC | Facility: CLINIC | Age: 54
End: 2024-09-29
Payer: COMMERCIAL

## 2024-09-29 VITALS
DIASTOLIC BLOOD PRESSURE: 62 MMHG | TEMPERATURE: 98 F | OXYGEN SATURATION: 98 % | HEIGHT: 61 IN | RESPIRATION RATE: 16 BRPM | HEART RATE: 76 BPM | WEIGHT: 162 LBS | SYSTOLIC BLOOD PRESSURE: 104 MMHG | BODY MASS INDEX: 30.58 KG/M2

## 2024-09-29 DIAGNOSIS — H57.89 EYE REDNESS: ICD-10-CM

## 2024-09-29 DIAGNOSIS — H04.123 DRY EYES: Primary | ICD-10-CM

## 2024-09-29 PROCEDURE — 3078F DIAST BP <80 MM HG: CPT | Performed by: NURSE PRACTITIONER

## 2024-09-29 PROCEDURE — 99213 OFFICE O/P EST LOW 20 MIN: CPT | Performed by: NURSE PRACTITIONER

## 2024-09-29 PROCEDURE — 3074F SYST BP LT 130 MM HG: CPT | Performed by: NURSE PRACTITIONER

## 2024-09-29 PROCEDURE — 3008F BODY MASS INDEX DOCD: CPT | Performed by: NURSE PRACTITIONER

## 2024-09-29 NOTE — PATIENT INSTRUCTIONS
May use over the counter lubricating eye ointment  Follow up with ophthalmology this week  Seek urgent follow up for new/ worsening symptoms.

## 2024-09-29 NOTE — PROGRESS NOTES
CHIEF COMPLAINT:     Chief Complaint   Patient presents with    Eye Problem     Vision screen R20/20 L20/25    Bilat eye redness x 6 days,   Denies pain, discharge        HPI:   Elizabeth Reese is a 54 year old female who presents with chief complaint of eye redness. Eyes feel dry. No pain, drainage or crusting. Was seen 9/18 and treated for sinobronchitis, those symptoms have resolved. No longer has cough or congestion, reports was having headaches on and off after being sick which improved with ibuprofen and massage. She denies any changes in vision, eye watering, or itching. Denies photophobia, pain with movement of eye, fever, cold symptoms, or contact with irritant.  Patient does not wear contacts.     Current Outpatient Medications   Medication Sig Dispense Refill    ondansetron 4 MG Oral Tablet Dispersible Take 1 tablet (4 mg total) by mouth every 8 (eight) hours as needed for Nausea. 10 tablet 0    albuterol (PROAIR HFA) 108 (90 Base) MCG/ACT Inhalation Aero Soln 2 puffs every 4-6 hours prn wheezing or shortness of breath 1 each 0    diclofenac 75 MG Oral Tab EC Take 1 tablet (75 mg total) by mouth 2 (two) times daily. 60 tablet 1    LOSARTAN 50 MG Oral Tab TAKE 1 TABLET BY MOUTH EVERY DAY 90 tablet 1    cyclobenzaprine 5 MG Oral Tab Take 1 tablet (5 mg total) by mouth 3 (three) times daily as needed for Muscle spasms. 30 tablet 2    zolpidem 5 MG Oral Tab Take 1 tablet (5 mg total) by mouth nightly as needed for Sleep. 30 tablet 1      Past Medical History:    Abdominal pain, unspecified site    Dysuria    Personal history of urinary (tract) infection      Past Surgical History:   Procedure Laterality Date    Tubal ligation  1/1/08      Family History   Problem Relation Age of Onset    Breast Cancer Maternal Aunt 62        early 60's    Other (colon cancer[other]) Other         family hx    Other (emphysema[other]) Father     Other (ulcer[other]) Father         gastric    Hypertension Mother       Social  History     Socioeconomic History    Marital status:    Tobacco Use    Smoking status: Never    Smokeless tobacco: Never   Substance and Sexual Activity    Alcohol use: Yes     Alcohol/week: 0.0 standard drinks of alcohol    Drug use: No   Other Topics Concern    Caffeine Concern Yes    Exercise No         REVIEW OF SYSTEMS:   GENERAL: feels well otherwise  SKIN: no rashes  EYES:denies blurred vision or double vision. See HPI  HENT: denies ear pain, congestion, sore throat  LUNGS: denies shortness of breath or cough  CARDIOVASCULAR: denies chest pain or palpitations   GI: denies N/V/C or abdominal pain  NEURO: denies dizziness or weakness    EXAM:   /62   Pulse 76   Temp 97.7 °F (36.5 °C)   Resp 16   Ht 5' 1\" (1.549 m)   Wt 162 lb (73.5 kg)   SpO2 98%   BMI 30.61 kg/m²   GENERAL: well developed, well nourished,in no apparent distress  SKIN: no rashes,no suspicious lesions  EYES: PERRLA, EOMI, bilateral conjunctiva with mild hyperemia. No drainage.   HENT: atraumatic, normocephalic,ears and throat are clear  NECK: supple, non tender  LUNGS: clear to auscultation bilaterally.   CARDIO: RRR without murmur  LYMPH: no preauricular lymphadenopathy. No cervical lymphadenopathy    Visual Acuity     Vision Screen Test Type: Snellen Wall Chart    Right Eye Visual Acuity: Corrected Right Eye Chart Acuity: 20/20   Left Eye Visual Acuity: Corrected Left Eye Chart Acuity: 20/25              ASSESSMENT AND PLAN:   Elizabeth Reese is a 54 year old female who presents with:    ASSESSMENT:   Encounter Diagnoses   Name Primary?    Dry eyes Yes    Eye redness        PLAN:   Advised otc eye lubricating ointment.   Follow up with ophthalmology this week   Seek sooner follow up for new/ worsening symptoms    Requested Prescriptions      No prescriptions requested or ordered in this encounter           Patient Instructions   May use over the counter lubricating eye ointment  Follow up with ophthalmology this  week  Seek urgent follow up for new/ worsening symptoms.     Call or return if not improved in 2-3 days.  The patient is asked to follow up with their PCP prn.

## 2024-09-30 NOTE — TELEPHONE ENCOUNTER
Dr Nagel,     Patient is seeking intermittent Family Medical Leave Act for chronic radicular lumbar pain. She is seeking 1-2 flare ups per month, each lasting 1 day. Start: 10/1/24, End: 10/1/25. Do you support this request?    Thank You,   Sunshine YE, Forms Dept

## 2024-09-30 NOTE — TELEPHONE ENCOUNTER
Patient calling to see if her forms were completed. Let her know that they were not completed yet because we tried to get a hold of her a few times to obtain details but she did not call. Obtained details so forms can be completed.     Type of Leave:  Intermittent FMLA  Reason for Leave: Back Injury -Chronic radicular lumbar pain   Start date of leave: 10/1/24  End date of leave: 10/1/25  How many flare ups per month/length?: 1-2 times per month, each lasting 1 day.   How many appts per month/length?: does not need appointments.   Was Fee and Turnaround info Given?:

## 2024-10-01 ENCOUNTER — TELEPHONE (OUTPATIENT)
Dept: FAMILY MEDICINE CLINIC | Facility: CLINIC | Age: 54
End: 2024-10-01

## 2024-10-01 NOTE — TELEPHONE ENCOUNTER
Dr. Nagel,    Please sign off on form if you agree to: Intermittent Family Medical Leave Act from 10/1/24 to 10/1/25 due to chronic radicular lumbar pain, 1-2 flare ups/mo lasting 1 day.    -Signature page will be the first page scanned  -From your Inbasket, Highlight the patient and click Chart   -Double click the 10/1/24 Forms Completion telephone encounter  -Scroll down to the Media section   -Click the blue Hyperlink: Family Medical Leave Act, Dr. Nagel, 10/1/24    -Click Acknowledge located in the top right ribbon/menu   -Drag the mouse into the blank space of the document and a + sign will appear. Left click to   electronically sign the document.  -Once signed, simply exit out of the screen and you signature will be saved.     Thank you,  Marley CUTLER

## 2024-10-02 ENCOUNTER — TELEPHONE (OUTPATIENT)
Facility: CLINIC | Age: 54
End: 2024-10-02

## 2024-10-02 NOTE — TELEPHONE ENCOUNTER
10/1/24 1213- late entry  Patient  calling RN stating she cannot schedule the thyroid uptake and scan ordered per Dr. Wiley on 1/10/24 because Dr. Wiley is not an active provider. Per Dr. Wiley's LOV dated -1/10/24   \"- TSH, free T4, TSI, Uptake & Scan  - further w/u pending labs/imaging results  - if stable TFTs and workup unrevealing, would then repeat labs in 6 months     Return to Clinic in 6 months\"     Last TFTS 10/26/23, ordered per Dr. Noé Nagel  Free T4--> 1.2  Free T3--> 2.64  TSH--> 0.246    Labs ordered on 1/10/24 per Dr. Wiley- TSI, TSH/Free T4, Free T3    RN notified patient she needs to be seen by one of our endocrinologists; then that provider can reorder the thyroid  uptake and scan in their name.  Patient was offered an appointment with Dr. Vazquez for 10/8/24 but patient declined appointment. Waiting for patient to call office back as another appointment with Dr. Vazquez was offered to her-message was left on voice mail per FORD Morgan.

## 2024-10-03 ENCOUNTER — PATIENT MESSAGE (OUTPATIENT)
Dept: ADMINISTRATIVE | Age: 54
End: 2024-10-03

## 2024-10-09 ENCOUNTER — MED REC SCAN ONLY (OUTPATIENT)
Dept: FAMILY MEDICINE CLINIC | Facility: CLINIC | Age: 54
End: 2024-10-09

## 2024-10-30 ENCOUNTER — HOSPITAL ENCOUNTER (OUTPATIENT)
Dept: MAMMOGRAPHY | Age: 54
Discharge: HOME | End: 2024-10-30
Attending: FAMILY MEDICINE
Payer: COMMERCIAL

## 2024-10-30 DIAGNOSIS — Z12.31 ENCOUNTER FOR SCREENING MAMMOGRAM FOR MALIGNANT NEOPLASM OF BREAST: ICD-10-CM

## 2024-10-30 PROCEDURE — 77067 SCR MAMMO BI INCL CAD: CPT | Performed by: FAMILY MEDICINE

## 2024-10-30 PROCEDURE — 77063 BREAST TOMOSYNTHESIS BI: CPT | Performed by: FAMILY MEDICINE

## 2024-11-06 ENCOUNTER — LAB ENCOUNTER (OUTPATIENT)
Dept: LAB | Age: 54
End: 2024-11-06
Attending: FAMILY MEDICINE
Payer: COMMERCIAL

## 2024-11-06 DIAGNOSIS — E78.00 ELEVATED CHOLESTEROL: ICD-10-CM

## 2024-11-06 DIAGNOSIS — R73.9 HYPERGLYCEMIA: ICD-10-CM

## 2024-11-06 DIAGNOSIS — E05.90 SUBCLINICAL HYPERTHYROIDISM: ICD-10-CM

## 2024-11-06 LAB
ALBUMIN SERPL-MCNC: 4.3 G/DL (ref 3.2–4.8)
ALBUMIN/GLOB SERPL: 1.3 {RATIO} (ref 1–2)
ALP LIVER SERPL-CCNC: 83 U/L
ALT SERPL-CCNC: 57 U/L
ANION GAP SERPL CALC-SCNC: 5 MMOL/L (ref 0–18)
AST SERPL-CCNC: 39 U/L (ref ?–34)
BILIRUB SERPL-MCNC: 0.7 MG/DL (ref 0.3–1.2)
BUN BLD-MCNC: 14 MG/DL (ref 9–23)
CALCIUM BLD-MCNC: 10.2 MG/DL (ref 8.7–10.4)
CHLORIDE SERPL-SCNC: 106 MMOL/L (ref 98–112)
CHOLEST SERPL-MCNC: 230 MG/DL (ref ?–200)
CO2 SERPL-SCNC: 29 MMOL/L (ref 21–32)
CREAT BLD-MCNC: 0.72 MG/DL
EGFRCR SERPLBLD CKD-EPI 2021: 101 ML/MIN/1.73M2 (ref 60–?)
EST. AVERAGE GLUCOSE BLD GHB EST-MCNC: 123 MG/DL (ref 68–126)
FASTING PATIENT LIPID ANSWER: YES
FASTING STATUS PATIENT QL REPORTED: YES
GLOBULIN PLAS-MCNC: 3.4 G/DL (ref 2–3.5)
GLUCOSE BLD-MCNC: 114 MG/DL (ref 70–99)
HBA1C MFR BLD: 5.9 % (ref ?–5.7)
HDLC SERPL-MCNC: 50 MG/DL (ref 40–59)
LDLC SERPL CALC-MCNC: 154 MG/DL (ref ?–100)
NONHDLC SERPL-MCNC: 180 MG/DL (ref ?–130)
OSMOLALITY SERPL CALC.SUM OF ELEC: 291 MOSM/KG (ref 275–295)
POTASSIUM SERPL-SCNC: 4.2 MMOL/L (ref 3.5–5.1)
PROT SERPL-MCNC: 7.7 G/DL (ref 5.7–8.2)
SODIUM SERPL-SCNC: 140 MMOL/L (ref 136–145)
TRIGL SERPL-MCNC: 146 MG/DL (ref 30–149)
VLDLC SERPL CALC-MCNC: 28 MG/DL (ref 0–30)

## 2024-11-06 PROCEDURE — 84439 ASSAY OF FREE THYROXINE: CPT

## 2024-11-06 PROCEDURE — 36415 COLL VENOUS BLD VENIPUNCTURE: CPT

## 2024-11-06 PROCEDURE — 80061 LIPID PANEL: CPT

## 2024-11-06 PROCEDURE — 83036 HEMOGLOBIN GLYCOSYLATED A1C: CPT

## 2024-11-06 PROCEDURE — 84481 FREE ASSAY (FT-3): CPT

## 2024-11-06 PROCEDURE — 84443 ASSAY THYROID STIM HORMONE: CPT

## 2024-11-06 PROCEDURE — 80053 COMPREHEN METABOLIC PANEL: CPT

## 2024-11-07 ENCOUNTER — OFFICE VISIT (OUTPATIENT)
Dept: FAMILY MEDICINE CLINIC | Facility: CLINIC | Age: 54
End: 2024-11-07
Payer: COMMERCIAL

## 2024-11-07 VITALS
DIASTOLIC BLOOD PRESSURE: 72 MMHG | RESPIRATION RATE: 18 BRPM | HEIGHT: 61 IN | SYSTOLIC BLOOD PRESSURE: 102 MMHG | HEART RATE: 81 BPM | BODY MASS INDEX: 30.78 KG/M2 | OXYGEN SATURATION: 97 % | WEIGHT: 163 LBS

## 2024-11-07 DIAGNOSIS — Z12.4 CERVICAL CANCER SCREENING: ICD-10-CM

## 2024-11-07 DIAGNOSIS — R73.03 PREDIABETES: ICD-10-CM

## 2024-11-07 DIAGNOSIS — I10 PRIMARY HYPERTENSION: ICD-10-CM

## 2024-11-07 DIAGNOSIS — Z00.00 WELLNESS EXAMINATION: Primary | ICD-10-CM

## 2024-11-07 DIAGNOSIS — G89.29 CHRONIC RADICULAR LUMBAR PAIN: ICD-10-CM

## 2024-11-07 DIAGNOSIS — E05.90 SUBCLINICAL HYPERTHYROIDISM: ICD-10-CM

## 2024-11-07 DIAGNOSIS — M54.16 CHRONIC RADICULAR LUMBAR PAIN: ICD-10-CM

## 2024-11-07 DIAGNOSIS — Z12.11 SCREEN FOR COLON CANCER: ICD-10-CM

## 2024-11-07 LAB
T3FREE SERPL-MCNC: 3.21 PG/ML (ref 2.4–4.2)
T4 FREE SERPL-MCNC: 1.5 NG/DL (ref 0.8–1.7)
TSI SER-ACNC: 0.3 UIU/ML (ref 0.55–4.78)

## 2024-11-07 PROCEDURE — 3008F BODY MASS INDEX DOCD: CPT | Performed by: FAMILY MEDICINE

## 2024-11-07 PROCEDURE — 3078F DIAST BP <80 MM HG: CPT | Performed by: FAMILY MEDICINE

## 2024-11-07 PROCEDURE — 99396 PREV VISIT EST AGE 40-64: CPT | Performed by: FAMILY MEDICINE

## 2024-11-07 PROCEDURE — 3074F SYST BP LT 130 MM HG: CPT | Performed by: FAMILY MEDICINE

## 2024-11-07 RX ORDER — LOSARTAN POTASSIUM 50 MG/1
50 TABLET ORAL DAILY
Qty: 90 TABLET | Refills: 1 | Status: SHIPPED | OUTPATIENT
Start: 2024-11-07

## 2024-11-07 NOTE — PATIENT INSTRUCTIONS
Health Screening Guidelines, Women Ages 50 to 64   Screening tests and health counseling are a key part of managing your health. A screening test is done to find disorders or diseases in people who don't have any symptoms. Screening tests are not used to diagnose. They are used to find out if more testing is needed. The goal may be to find a disease early so it can be treated with more success. Or the goal may be to find a disease early so you can make lifestyle changes. You may need regular checkups to help you reduce your risk of disease.   Below are guidelines for women ages 50 to 64. Talk with your healthcare provider. Make sure you’re up-to-date on what you need.   Gendered terms are used here to talk about anatomy and health risk. Please use this information in a way that works best for you and your provider as you talk about your care.   Screening  Who needs it  How often    Type 2 diabetes or prediabetes  All women in this age group who are overweight or obese, or had gestational diabetes  At least every 3 years    Type 2 diabetes All women with prediabetes  Every year   Unhealthy alcohol use  All women in this age group  At routine exams   Blood pressure All women in this age group  Once a year if your blood pressure is normal. Normal blood pressure is less than 120/80 mm Hg. If your blood pressure is higher than this, follow the advice of your healthcare provider.    Breast cancer All women in this age group at average risk  Advice varies. Talk with your provider to find out what's best for you.   All women should know how their breasts normally look and feel. They should know the benefits and risks of breast cancer screening with mammograms.    Cervical cancer All women in this age group, unless they have had a complete hysterectomy  Pap test every 3 years or Pap test with high-risk human papillomavirus (hrHPV) test every 5 years    Chlamydia Women who are sexually active and at higher risk of infection   At yearly routine exams    Colorectal cancer All women in this age group at average risk  Talk with your healthcare provider about which test below is right for you:   Flexible sigmoidoscopy every 5 years  Colonoscopy every 10 years  CT colonography (virtual colonoscopy) every 5 years  Yearly fecal occult blood test  Yearly fecal immunochemical test (FIT)  Stool DNA test every 3 years  Double contrast barium enema every 5 years  If you have a test that is not a colonoscopy and have an abnormal test result, you will need a colonoscopy.   You may need to be screened more or less often. This is based on personal or family health history. Talk with your healthcare provider.    Depression All women in this age group  At routine exams   Gonorrhea Sexually active women who are at higher risk of infection  At yearly routine exams    Hepatitis C All women in this age group  At routine exams   High cholesterol or triglycerides  All women in this age group who are at risk for coronary artery disease  At least every 5 years. Talk with your healthcare provider about your risk.    HIV All women in this age group  At least 1 time. Get tested yearly if you're at high risk.    Lung cancer All women in this age group who are in fairly good health, are at higher risk for lung cancer, and who:       Smoke or quit in the past 15 years  Have a 20-pack- per year smoking history (1 pack a day for 20 years or 2 packs a day for 10 years)      Expert groups vary in their advice. Talk with your healthcare provider.  Yearly lung cancer screening with a low-dose CT scan (LDCT). Talk with your healthcare provider.    Obesity All women in this age group  At yearly routine exams    Osteoporosis Women who are postmenopausal  Talk with your healthcare provider    Syphilis Women who are at higher risk of infection. Talk with your healthcare provider.  At routine exams   Tuberculosis Women who are at higher risk of infection  Talk with your healthcare  provider    Vision All women in this age group  Talk with your healthcare provider    Health counseling Who needs it How often   BRCA gene mutation testing for breast and ovarian cancer susceptibility  Women who are at higher risk of having this gene mutation. Talk with your healthcare provider.  When your risk is known    Breast cancer and chemoprevention  Women who are at high risk for breast cancer. Talk with your healthcare provider.  When your risk is known    Diet and exercise Women who are overweight or obese  When diagnosed, and then at routine exams    Sexually transmitted infection (STI) prevention  Women who are at higher risk of infection. Talk with your healthcare provider.  At routine exams   Use of tobacco and the health effects it can cause  All women in this age group  Every exam   [employer] last reviewed this educational content on 7/1/2022 © 2000-2023 [employer]. All rights reserved. This information is not intended as a substitute for professional medical care. Always follow your healthcare professional's instructions.

## 2024-11-07 NOTE — PROGRESS NOTES
HPI:   Elizabeth presents for an annual wellness visit.   Doing well overall. Denies any acute concerns. Has been doing yoga/stretching for her back pain. She has been exercising at the gym.      Allergies:   Allergies[1]        CURRENT MEDICATIONS    losartan 50 MG Oral Tab Take 1 tablet (50 mg total) by mouth daily. 90 tablet 1    cyclobenzaprine 5 MG Oral Tab Take 1 tablet (5 mg total) by mouth 3 (three) times daily as needed for Muscle spasms. 30 tablet 2    zolpidem 5 MG Oral Tab Take 1 tablet (5 mg total) by mouth nightly as needed for Sleep. 30 tablet 1        HISTORICAL INFORMATION   Past Medical History:    Abdominal pain, unspecified site    Dysuria    Personal history of urinary (tract) infection        Past Surgical History:   Procedure Laterality Date    Tubal ligation  1/1/08           SOCIAL HISTORY   Social Drivers of Health     Tobacco Use: Low Risk  (11/7/2024)    Patient History     Smoking Tobacco Use: Never     Smokeless Tobacco Use: Never     Passive Exposure: Not on file   Financial Resource Strain: Not on file   Food Insecurity: Not on file   Transportation Needs: Not on file   Physical Activity: Not on file   Stress: Not on file   Social Connections: Not on file   Domestic Safety: Unknown (2/15/2024)    Domestic Safety     Domestic Safety - Pt Reported (Most Recent Flow): Not on file     Domestic Safety - Signs of abuse/neglect: Not on file   Depression: Not at risk (9/18/2024)    PHQ-2     PHQ-2 Score: 0   Housing Stability: Not on file   Utilities: Not on file   Access to Medications: Not on file   Health Literacy: Not on file            REVIEW OF SYSTEMS:     Constitutional: negative  Eyes: negative  ENT: negative  Respiratory: negative  Cardiovascular: negative  Gastrointestinal: negative  Integument/Breast: negative  Genitourinary: negative  Heme/Lymph: negative  Musculoskeletal: negative  Neurological: negative  Psych: negative  Endocrine: negative  Allergic/Immune: negative    EXAM:      Vitals:    11/07/24 1346   BP: 102/72   Pulse: 81   Resp: 18     Body mass index is 30.8 kg/m².  Wt Readings from Last 6 Encounters:   11/07/24 163 lb (73.9 kg)   09/29/24 162 lb (73.5 kg)   09/18/24 160 lb (72.6 kg)   09/17/24 158 lb (71.7 kg)   06/26/24 161 lb 12.8 oz (73.4 kg)   10/16/23 160 lb (72.6 kg)         General: alert, appears stated age, and cooperative  Head: Normocephalic, without obvious abnormality, atraumatic  Eyes: negative  Ears: normal TM's and external ear canals both ears  Nose: Nares normal. Septum midline. Mucosa normal. No drainage or sinus tenderness.  Throat: lips, mucosa, and tongue normal; teeth and gums normal  Neck: no adenopathy, supple, symmetrical, trachea midline, and thyroid not enlarged, symmetric, no tenderness/mass/nodules  Heart: S1, S2 normal, no murmur, click, rub or gallop, regular rate and rhythm  Lungs: clear to auscultation bilaterally  Chest wall: no tenderness  Abdomen: soft, non-tender; bowel sounds normal; no masses,  no organomegaly  : deferred  Back: negative  Extremities: extremities normal, atraumatic, no cyanosis or edema  Pulses: 2+ and symmetric  Skin: Skin color, texture, turgor normal. No rashes or lesions  Lymph Nodes: No LAD  Neurologic: Grossly normal    ASSESSMENT AND PLAN:   Charles was seen today for physical.    Diagnoses and all orders for this visit:    1. Wellness examination  -Immunizations: Recommend shingrex #2, will come back  -Metabolic: BMI 30. BP wnl. Due for annual labs   -Cancer screening: due for CRC, pap screening  -Communicable disease: low risk   -Mental health: no concerns   -Other preventative: follow with dentistry and optometry.   -Lifestyle: Follow a well balanced healthy diet with emphasis on fruits, vegetables, whole grains, lean meats. Limit processed and junk foods. Aim for at least 150 minutes of moderate intensity exercise weekly. Make sure you are staying adequately hydrated. Aim to get 7-9 hours of sleep nightly.        2. Prediabetes  Stable, Last A1c value was 5.9% done 11/6/2024.  Continue low carb diet, regular exercise, work on wt loss.     3. Screen for colon cancer  - SURGERY - INTERNAL    4. Primary hypertension  Stable, CPM   - losartan 50 MG Oral Tab; Take 1 tablet (50 mg total) by mouth daily.  Dispense: 90 tablet; Refill: 1    5. Cervical cancer screening  - OBG Referral - In Network    6. Subclinical hyperthyroidism  Due for recheck of TSH  - TSH W Reflex To Free T4; Future    7. Chronic radicular lumbar pain  Stable, pain/sx controlled. Continue home exercise program, supportive care measures.          [1]   Allergies  Allergen Reactions    Shrimp ITCHING

## 2024-11-11 ENCOUNTER — HOSPITAL ENCOUNTER (OUTPATIENT)
Dept: ULTRASOUND IMAGING | Age: 54
Discharge: HOME | End: 2024-11-11
Attending: FAMILY MEDICINE
Payer: COMMERCIAL

## 2024-11-11 ENCOUNTER — HOSPITAL ENCOUNTER (OUTPATIENT)
Dept: MAMMOGRAPHY | Age: 54
Discharge: HOME | End: 2024-11-11
Attending: FAMILY MEDICINE
Payer: COMMERCIAL

## 2024-11-11 DIAGNOSIS — R92.2 INCONCLUSIVE MAMMOGRAM: ICD-10-CM

## 2024-11-11 PROCEDURE — 77066 DX MAMMO INCL CAD BI: CPT | Performed by: FAMILY MEDICINE

## 2024-11-11 PROCEDURE — 77062 BREAST TOMOSYNTHESIS BI: CPT | Performed by: FAMILY MEDICINE

## 2024-11-11 PROCEDURE — 76642 ULTRASOUND BREAST LIMITED: CPT | Performed by: FAMILY MEDICINE

## 2024-12-05 ENCOUNTER — HOSPITAL ENCOUNTER (OUTPATIENT)
Dept: NUCLEAR MEDICINE | Facility: HOSPITAL | Age: 54
Discharge: HOME | End: 2024-12-05
Attending: STUDENT IN AN ORGANIZED HEALTH CARE EDUCATION/TRAINING PROGRAM
Payer: COMMERCIAL

## 2024-12-05 DIAGNOSIS — E04.2 MULTINODULAR GOITER: ICD-10-CM

## 2024-12-05 DIAGNOSIS — E05.90 SUBCLINICAL HYPERTHYROIDISM: ICD-10-CM

## 2024-12-05 PROCEDURE — 78014 THYROID IMAGING W/BLOOD FLOW: CPT | Performed by: STUDENT IN AN ORGANIZED HEALTH CARE EDUCATION/TRAINING PROGRAM

## 2025-02-12 ENCOUNTER — OFFICE VISIT (OUTPATIENT)
Dept: FAMILY MEDICINE CLINIC | Facility: CLINIC | Age: 55
End: 2025-02-12
Payer: COMMERCIAL

## 2025-02-12 VITALS
SYSTOLIC BLOOD PRESSURE: 102 MMHG | WEIGHT: 170 LBS | OXYGEN SATURATION: 97 % | RESPIRATION RATE: 18 BRPM | DIASTOLIC BLOOD PRESSURE: 78 MMHG | HEART RATE: 75 BPM | HEIGHT: 61 IN | BODY MASS INDEX: 32.1 KG/M2

## 2025-02-12 DIAGNOSIS — I10 PRIMARY HYPERTENSION: ICD-10-CM

## 2025-02-12 DIAGNOSIS — H81.12 BENIGN PAROXYSMAL POSITIONAL VERTIGO OF LEFT EAR: Primary | ICD-10-CM

## 2025-02-12 PROCEDURE — 3008F BODY MASS INDEX DOCD: CPT | Performed by: FAMILY MEDICINE

## 2025-02-12 PROCEDURE — 99214 OFFICE O/P EST MOD 30 MIN: CPT | Performed by: FAMILY MEDICINE

## 2025-02-12 PROCEDURE — 3074F SYST BP LT 130 MM HG: CPT | Performed by: FAMILY MEDICINE

## 2025-02-12 PROCEDURE — 3078F DIAST BP <80 MM HG: CPT | Performed by: FAMILY MEDICINE

## 2025-02-12 RX ORDER — MECLIZINE HYDROCHLORIDE 25 MG/1
25 TABLET ORAL 3 TIMES DAILY PRN
Qty: 30 TABLET | Refills: 1 | Status: SHIPPED | OUTPATIENT
Start: 2025-02-12

## 2025-02-12 NOTE — PROGRESS NOTES
Subjective:   Elizabeth Reese is a 54 year old female who presents for Dizziness (3 weeks on and off. Pt states she she bends over or turning in bed symptoms are worse. Pt feels nauseated but no vomiting // //The following individual(s) verbally consented to be recorded using ambient AI listening technology and understand that they can each withdraw their)       History/Other:   History of Present Illness  Elizabeth Reese is a 54 year old female who presents with dizziness for three weeks.    She has been experiencing dizziness intermittently for approximately three weeks, particularly triggered by bending over or looking up. During these episodes, she experiences a sensation of spinning and feels nauseated. The dizziness is more pronounced when turning to the left side while lying down or getting up from bed. No dizziness occurs when turning her head quickly to the sides.    Her blood pressure readings have been on the lower end of normal, with recent measurements of 102/78 mmHg. She is currently on losartan, although the dosage was not specified.   Chief Complaint Reviewed and Verified  Nursing Notes Reviewed and   Verified  Tobacco Reviewed  Allergies Reviewed  Medications Reviewed    Medical History Reviewed  Surgical History Reviewed  Family History   Reviewed  Social History Reviewed         Tobacco:  She has never smoked tobacco.    Current Outpatient Medications   Medication Sig Dispense Refill    meclizine 25 MG Oral Tab Take 1 tablet (25 mg total) by mouth 3 (three) times daily as needed for Dizziness or Nausea. 30 tablet 1    losartan 50 MG Oral Tab Take 1 tablet (50 mg total) by mouth daily. 90 tablet 1    cyclobenzaprine 5 MG Oral Tab Take 1 tablet (5 mg total) by mouth 3 (three) times daily as needed for Muscle spasms. 30 tablet 2    zolpidem 5 MG Oral Tab Take 1 tablet (5 mg total) by mouth nightly as needed for Sleep. 30 tablet 1    diclofenac 75 MG Oral Tab EC Take 1 tablet (75 mg  total) by mouth 2 (two) times daily. (Patient not taking: Reported on 2/12/2025) 60 tablet 1       PHQ-2 SCORE: 0  , done 2/12/2025     Review of Systems:  Pertinent items are noted in HPI.    Objective:   /78   Pulse 75   Resp 18   Ht 5' 1\" (1.549 m)   Wt 170 lb (77.1 kg)   SpO2 97%   BMI 32.12 kg/m²  Estimated body mass index is 32.12 kg/m² as calculated from the following:    Height as of this encounter: 5' 1\" (1.549 m).    Weight as of this encounter: 170 lb (77.1 kg).  Results  Procedure: [place]-Hallpike maneuver  Description: Patient was quickly brought down to a supine position with head tilted. Left side elicited vertigo, right side did not.     Physical Exam  VITALS: BP- 102/78  HEENT: PERRLA Ear exam wnl b/l  CV: RRR no m/r/g  RESP: CTA b/l   NEUROLOGICAL: Positive for vertigo on left side with [place]-Hallpike maneuver. Otherwise no focal deficits.       Assessment & Plan:   1. Benign paroxysmal positional vertigo of left ear (Primary)  -     Meclizine HCl; Take 1 tablet (25 mg total) by mouth 3 (three) times daily as needed for Dizziness or Nausea.  Dispense: 30 tablet; Refill: 1  -     Physical Therapy Referral - [facility]    Assessment & Plan  Benign Paroxysmal Positional Vertigo (BPPV)  Dizziness for three weeks, triggered by bending, looking up, and turning in bed. Positive Taiwo-Hallpike test on the left side.  -Provide Meclizine for as-needed use, up to three times a day.  -Provide patient with home Epley maneuver instructions.  -Refer to neuro physical therapist for further training if home exercises are not effective.    Hypertension  Well-controlled with Losartan, with potential for orthostatic hypotension.  -Continue current dose of Losartan.  -Monitor blood pressure and adjust medication dose if necessary (can go down to losartan 25mg if continues to experience orthostatic symptoms).        Return if symptoms worsen or fail to improve.        Noé Nagel MD, 2/12/2025, 2:28  PM

## 2025-02-28 ENCOUNTER — OFFICE VISIT (OUTPATIENT)
Dept: OBGYN CLINIC | Facility: CLINIC | Age: 55
End: 2025-02-28
Payer: COMMERCIAL

## 2025-02-28 VITALS
BODY MASS INDEX: 31.91 KG/M2 | WEIGHT: 169 LBS | DIASTOLIC BLOOD PRESSURE: 64 MMHG | HEIGHT: 61 IN | HEART RATE: 85 BPM | SYSTOLIC BLOOD PRESSURE: 112 MMHG

## 2025-02-28 DIAGNOSIS — Z12.4 SCREENING FOR CERVICAL CANCER: ICD-10-CM

## 2025-02-28 DIAGNOSIS — Z12.31 ENCOUNTER FOR SCREENING MAMMOGRAM FOR MALIGNANT NEOPLASM OF BREAST: ICD-10-CM

## 2025-02-28 DIAGNOSIS — Z01.419 WELL WOMAN EXAM WITH ROUTINE GYNECOLOGICAL EXAM: Primary | ICD-10-CM

## 2025-02-28 DIAGNOSIS — Z11.51 SCREENING FOR HUMAN PAPILLOMAVIRUS (HPV): ICD-10-CM

## 2025-02-28 PROCEDURE — 99459 PELVIC EXAMINATION: CPT | Performed by: NURSE PRACTITIONER

## 2025-02-28 PROCEDURE — 3008F BODY MASS INDEX DOCD: CPT | Performed by: NURSE PRACTITIONER

## 2025-02-28 PROCEDURE — 87624 HPV HI-RISK TYP POOLED RSLT: CPT | Performed by: NURSE PRACTITIONER

## 2025-02-28 PROCEDURE — 3074F SYST BP LT 130 MM HG: CPT | Performed by: NURSE PRACTITIONER

## 2025-02-28 PROCEDURE — 3078F DIAST BP <80 MM HG: CPT | Performed by: NURSE PRACTITIONER

## 2025-02-28 PROCEDURE — 99396 PREV VISIT EST AGE 40-64: CPT | Performed by: NURSE PRACTITIONER

## 2025-02-28 PROCEDURE — 88175 CYTOPATH C/V AUTO FLUID REDO: CPT | Performed by: NURSE PRACTITIONER

## 2025-02-28 RX ORDER — MULTIVITAMIN
TABLET ORAL
COMMUNITY

## 2025-02-28 NOTE — PROGRESS NOTES
Subjective:  Chief Complaint   Patient presents with    Annual     Patient is here for an annual exam.      54 year old female  presents for annual.    Denies current complaints    No LMP recorded. Patient is perimenopausal.  Hx Prior Abnormal Pap: No  Pap Date: 19  Pap Result Notes: wnl  Menarche: 15 (2025 11:39 AM)  Period Cycle (Days): perimenopausal (2025 11:39 AM)  Period Flow: spotting (2025 11:39 AM)  Hx Prior Abnormal Pap: No (2025 11:39 AM)  Pap Date: 19 (2025 11:39 AM)  Pap Result Notes: wnl (2025 11:39 AM)    Last Mammo:  10/2024    Feeling safe at home.    Most Recent Immunizations   Administered Date(s) Administered    >=9 YRS AFLURIA TRI PRESERV FREE SINGLE DOSE (17383) [facility] 2015    Covid-19 Vaccine Pfizer 30 mcg/0.3 ml 2021    FLUZONE 6 months and older PFS 0.5 ml (18331) 10/03/2024    Influenza 10/02/2023    TDAP 2017    Zoster Vaccine Recombinant Adjuvanted (Shingrix) 2022      reports that she has never smoked. She has never used smokeless tobacco.   reports that she does not currently use alcohol.    Past Medical History:    Abdominal pain, unspecified site    Dysuria    Personal history of urinary (tract) infection     Past Surgical History:   Procedure Laterality Date    Tubal ligation  08       Review of Systems:  Pertinent items are noted in the HPI.    Objective:  /64   Pulse 85   Ht 61\"   Wt 169 lb (76.7 kg)   BMI 31.93 kg/m²    Physical Examination:  General appearance: Well dressed, well nourished in no apparent distress  Neurologic/Psychiatric: Alert and oriented to person, place and time, mood normal, affect appropriate  Head: Normocephalic without obvious deformity, atraumatic  Neck: No thyromegaly, supple, non-tender, no masses, no adenopathy  Lungs: Clear to auscultation bilaterally, no rales, wheezes or rhonchi  Breasts: + bilateral nipple inversion, Symmetric, non-tender, no masses, lesions,  retraction, dimpling or discharge bilaterally, no axillary or supraclavicular lymphadenopathy  Heart: Regular rate and rhythm, no gallops or murmurs  Abdomen: Soft, non-tender, non-distended, no masses, no hepatosplenomegaly, no hernias, no inguinal lymphadenopathy  Pelvic:    External genitalia- Normal, Bartholin's, urethra, skeins glands normal   Vagina- No vaginal lesions, physiologic discharge   Urethra- Non-tender, no masses   Urethral Meatus- No lesions or masses, no prolapse   Bladder- Non-tender, no masses   Cervix- No lesions, long/closed, no cervical motion tenderness   Uterus- Normal sized, non-tender, no masses   Adnexa-  Non-tender, no masses   Anus/Perineum- Normal, no masses or lesions  Extremities: Non-tender, full range of motion, no clubbing, cyanosis or edema  Skin:  General inspection- no rashes, lesions or discoloration      Assessment/Plan:  Normal well-woman exam.  Yearly mammogram   Pap smear obtained.      Declined chaperone for exam today     Diagnoses and all orders for this visit:    Well woman exam with routine gynecological exam  - self breast exam discussed and encouraged    Screening for cervical cancer  -     ThinPrep PAP Smear; Future    Screening for human papillomavirus (HPV)  -     Hpv Dna  High Risk , Thin Prep Collect; Future    Encounter for screening mammogram for malignant neoplasm of breast  -     [facility] JOSIE 2D+3D SCREENING BILAT (CPT=77067/83446); Future         Return in about 1 year (around 2/28/2026) for annual well woman exam or sooner if needed.

## 2025-03-03 LAB — HPV E6+E7 MRNA CVX QL NAA+PROBE: NEGATIVE

## 2025-03-04 LAB
LAST PAP RESULT: NORMAL
PAP HISTORY (OTHER THAN LAST PAP): NORMAL

## 2025-03-12 ENCOUNTER — TELEPHONE (OUTPATIENT)
Dept: PHYSICAL THERAPY | Facility: HOSPITAL | Age: 55
End: 2025-03-12

## 2025-03-14 ENCOUNTER — TELEPHONE (OUTPATIENT)
Dept: PHYSICAL THERAPY | Age: 55
End: 2025-03-14

## 2025-03-18 ENCOUNTER — OFFICE VISIT (OUTPATIENT)
Dept: PHYSICAL THERAPY | Age: 55
End: 2025-03-18
Attending: FAMILY MEDICINE
Payer: COMMERCIAL

## 2025-03-18 DIAGNOSIS — H81.12 BENIGN PAROXYSMAL POSITIONAL VERTIGO OF LEFT EAR: Primary | ICD-10-CM

## 2025-03-18 PROCEDURE — 97161 PT EVAL LOW COMPLEX 20 MIN: CPT

## 2025-03-18 PROCEDURE — 95992 CANALITH REPOSITIONING PROC: CPT

## 2025-03-18 NOTE — PROGRESS NOTES
VESTIBULAR EVALUATION:     Diagnosis:   Benign paroxysmal positional vertigo of left ear (H81.12) Patient:  Elizabeth Reese (54 year old, female)        Referring Provider: Noé Nagel  Today's Date   3/18/2025    Precautions:  None   Date of Evaluation: 03/18/25  Next MD visit: No data recorded  Date of Surgery: n/a     PATIENT SUMMARY   Summary of chief complaints: positional vertigo  History of current condition: 1 month ago was getting out of bed and felt dizziness. Went to see GP >referred to OP PT and issued Bonner- darroff exercises which she is doing but its not helping.  Has dizziness when she turns onto left side.   Pain level: current 0 /10, at best 0 /10, at worst 0 /10  Social History:     Occupation: Nurse   Leisure activities/Hobbies:     Prior level of function: able to change positions without dizziness  Current limitations: dizziness with turing towards left, bending over, downward dog in yoga  Pt goals: get rid of dizziness  Symptoms with cough/sneeze or loud noise: No  Falls: No      Hx of migraines: Yes takes excederin   Hx of vision issue: Yes wears glassess  Hx of hearing issues: tinnitus (right)    Dizziness: Current: 0 /10, Best: 0 /10, Worst:7 /10  Quality: spinning  Frequency/Duration: with positonal changes, lasts  <1 min   Aggravates: supine to/from sit; rolling; quick head movements; bending over   Relieves: not moving     History of Headaches: negative     History of Cervical Pain: negative      Dizziness Handicap Inventory: (Patient-Rptd) 20-Mild Handicap.    Past medical history was reviewed with Elizabeth.  Significant findings include:    Imaging/Tests: none     Elizabeth  has a past medical history of Abdominal pain, unspecified site, Dysuria, and Personal history of urinary (tract) infection.  She  has a past surgical history that includes tubal ligation (1/1/08).    ASSESSMENT  Elizabeth presents to physical therapy evaluation with primary c/o positional vertigo. The  results of the objective tests and measures show + left taiwo hallpike  . Functional deficits include but are not limited to dizziness with turing towards left, bending over, downward dog in yoga. Signs and symptoms are consistent with diagnosis of Benign paroxysmal positional vertigo of left ear (H81.12). Pt and PT discussed evaluation findings, pathology, POC and HEP.  Pt voiced understanding and performs HEP correctly without reported pain. Skilled Physical Therapy is medically necessary to address the above impairments and reach functional goals.    OBJECTIVE:    Musculoskeltal:  Posture/Observation: rounded shoulder posture   Cervical ROM     Flex       Ext      R L     Side bend         Rotation         Adverse neuro signs with ROM: No    Neurological:  Neuro Screen: Sensation: WNL for light touch screen    Coordination Testing:   Finger to Nose: WNL   Pronation/supination: WNL  Toe tapping:  WNL      Oculomotor & Vestibular Exam: NT this visit. To be assessed at future visit as indicated    Positional Testing:  Taiwo Hallpike - Left   Latency (seconds) 0 sec   Duration (seconds) 20 sec   Direction no nystagmus seen   Symptom provocation Yes       [place] Hallpike Right - negative, no symptoms Horizontal Roll Test-  negative, no symptoms    Balance and Functional Mobility:  Postural Control:   NT this visit. To be assessed at future visit as indicated    Functional Mobility:   Gait: pt ambulates on level ground with steady gait  .       Today’s Treatment and Response: CRM- Epley x 3 rounds today, start on left  Pt education was provided on exam findings, treatment diagnosis, treatment plan, expectations, and prognosis.   Today's Treatment       3/18/2025   Vestibular Treatment   Additional Treatment CRM x 3 rounds dizziness 15-20 seconds each round, no nystagmus seen   Evaluation Minutes 20   Canalith Repositioning Minutes 25   Total Time Of Timed Procedures 0   Total Time Of Service-Based Procedures 45   Total  Treatment Time 45   HEP Sleep on 2 pillows, avoid sleeping on left side, avoid bending over too far or looking up too far      Patient was instructed in and issued a HEP for: Sleep on 2 pillows, avoid sleeping on left side, avoid bending over too far or looking up too far  Pt was also provided recommendations for: possible dizziness after evaluation; importance of remaining active.    Charges:  PT EVAL: Low Complexity, CRM 1  In agreement with evaluation findings and clinical rationale, this evaluation involved LOW COMPLEXITY decision making due to no personal factors/comorbidities, 1-2 body structures involved/activity limitations, and stable symptoms as documented in the evaluation.     PLAN OF CARE:    Goals: (to be met in 8 visits)   Pt to report ability to turn over in bed without dizziness.   Pt to report ability to bend forward to tie shoe laces without dizziness.   Pt to report ability to perform supine<>sit without dizziness.   Pt to report no c/o dizziness with positional changes x 30 days        Frequency / Duration: Patient will be seen 1-2x/week or a total of 8 visits over a 90 day period. Treatment will include: neuromuscular re-education; balance training; eye/head coordination training; canalith repositioning maneuver; habituation training for motion sensitivity and/or visual motion intolerance; symptom management instruction; patient education; home exercise program development and instruction    Education or treatment limitation: None   Rehab Potential: good     Patient/Family/Caregiver was advised of these findings, precautions, and treatment options and has agreed to actively participate in planning and for this course of care.     Thank you for your referral. Please co-sign or sign and return this letter via fax as soon as possible to [phone]. If you have any questions, please contact me at Dept: [phone]    Sincerely,  Electronically signed by therapist: Fany Nagel, PT  Physician's  certification required: Yes  I certify the need for these services furnished under this plan of treatment and while under my care.    X___________________________________________________ Date____________________    Certification From: 3/18/2025  To:6/16/2025

## 2025-03-25 ENCOUNTER — APPOINTMENT (OUTPATIENT)
Dept: PHYSICAL THERAPY | Age: 55
End: 2025-03-25
Attending: FAMILY MEDICINE
Payer: COMMERCIAL

## 2025-03-25 ENCOUNTER — TELEPHONE (OUTPATIENT)
Dept: PHYSICAL THERAPY | Facility: HOSPITAL | Age: 55
End: 2025-03-25

## 2025-03-28 ENCOUNTER — APPOINTMENT (OUTPATIENT)
Dept: PHYSICAL THERAPY | Age: 55
End: 2025-03-28
Attending: FAMILY MEDICINE
Payer: COMMERCIAL

## 2025-04-25 ENCOUNTER — TELEMEDICINE (OUTPATIENT)
Dept: FAMILY MEDICINE CLINIC | Facility: CLINIC | Age: 55
End: 2025-04-25
Payer: COMMERCIAL

## 2025-04-25 DIAGNOSIS — R22.1 LOCALIZED SWELLING, MASS AND LUMP, NECK: Primary | ICD-10-CM

## 2025-04-25 PROCEDURE — 98005 SYNCH AUDIO-VIDEO EST LOW 20: CPT | Performed by: FAMILY MEDICINE

## 2025-04-25 NOTE — PROGRESS NOTES
Virtual Video Check-In    Elizabeth Reese verbally consents to a Virtual/Telephone Check-In visit on 04/25/25.  Patient has been referred to the [facility] website at [email or web address] to review the yearly Consent to Treat document.    Patient understands and accepts financial responsibility for any deductible, co-insurance and/or co-pays associated with this service.    Duration of the service: 10 minutes      Summary of topics discussed:     HPI:    55yo F presents for 1 week history of R sided neck swelling, tenderness. No trauma or injury but thinks it might have been due sleeping on the bus. She reports that occasionally she falls asleep with neck bended forward. She has noticed swelling in R neck. She reports that it can radiate down to her R upper chest. Pain is provoked by lifting her right shoulder. She does see a dentist and has an appointment today; she was advised to wear a mouthguard for which I presume is due to TMJ but she denies any known bruxism.     She denies any recent infection, flu like symptoms. She denies any associated of symptoms with food. No history of salivary gland stones. Denies any difficulty eating. Denies any dyspnea. Denies any ear pain, hearing loss. Denies any L sided symptoms.       ROS: pertinent positives and negatives as per HPI     PE:   Gen: AAOx3  Head: NC/AT  Eyes: conjunctiva normal  Nose: No discharge visualized   Neck: No visible asymmetry noted.   Resp: breathing well on exam, normal effort. Able to speak full sentences without any SOB  Skin: no rashes, no lesions, no cyanosis  Psych: normal affect       Diagnoses and all orders for this visit:    Localized swelling, mass and lump, neck  -     US HEAD/NECK (CPT=76536); Future    Suspect possible neck strain possibly from sleeping vs TMJ syndrome. Also can consider possible dental etiology. Less likely that this is related to submandibular salivary gland pathology. Do not suspect that this is lymphadenopathy  however exam limited due to nature of today's visit. She does have follow up scheduled today with her dentist.    -Will order US for further evaluation  -Counseled on supportive care measures including ice, NSAIDs etc.   -Recommended to wear mouthguard.   -Start gentle ROM/conditioning exercises of neck.   -Schedule in person appointment within 1 week if no improvement or worsening of symptoms.       Noé Nagel MD

## 2025-04-28 ENCOUNTER — HOSPITAL ENCOUNTER (OUTPATIENT)
Dept: ULTRASOUND IMAGING | Age: 55
Discharge: HOME | End: 2025-04-28
Attending: FAMILY MEDICINE
Payer: COMMERCIAL

## 2025-04-28 DIAGNOSIS — R22.1 LOCALIZED SWELLING, MASS AND LUMP, NECK: ICD-10-CM

## 2025-04-28 PROCEDURE — 76536 US EXAM OF HEAD AND NECK: CPT | Performed by: FAMILY MEDICINE

## 2025-05-17 ENCOUNTER — OFFICE VISIT (OUTPATIENT)
Dept: FAMILY MEDICINE CLINIC | Facility: CLINIC | Age: 55
End: 2025-05-17
Payer: COMMERCIAL

## 2025-05-17 ENCOUNTER — TELEPHONE (OUTPATIENT)
Dept: FAMILY MEDICINE CLINIC | Facility: CLINIC | Age: 55
End: 2025-05-17

## 2025-05-17 VITALS
TEMPERATURE: 97 F | OXYGEN SATURATION: 97 % | RESPIRATION RATE: 16 BRPM | DIASTOLIC BLOOD PRESSURE: 78 MMHG | SYSTOLIC BLOOD PRESSURE: 122 MMHG | BODY MASS INDEX: 31.15 KG/M2 | HEART RATE: 83 BPM | WEIGHT: 165 LBS | HEIGHT: 61 IN

## 2025-05-17 DIAGNOSIS — R39.9 UTI SYMPTOMS: Primary | ICD-10-CM

## 2025-05-17 LAB
BILIRUBIN: NEGATIVE
GLUCOSE (URINE DIPSTICK): NEGATIVE MG/DL
KETONES (URINE DIPSTICK): NEGATIVE MG/DL
MULTISTIX LOT#: ABNORMAL NUMERIC
NITRITE, URINE: NEGATIVE
PH, URINE: 7 (ref 4.5–8)
PROTEIN (URINE DIPSTICK): >=300 MG/DL
SPECIFIC GRAVITY: 1.01 (ref 1–1.03)
URINE-COLOR: YELLOW
UROBILINOGEN,SEMI-QN: 0.2 MG/DL (ref 0–1.9)

## 2025-05-17 PROCEDURE — 3008F BODY MASS INDEX DOCD: CPT | Performed by: NURSE PRACTITIONER

## 2025-05-17 PROCEDURE — 99213 OFFICE O/P EST LOW 20 MIN: CPT | Performed by: NURSE PRACTITIONER

## 2025-05-17 PROCEDURE — 3078F DIAST BP <80 MM HG: CPT | Performed by: NURSE PRACTITIONER

## 2025-05-17 PROCEDURE — 81003 URINALYSIS AUTO W/O SCOPE: CPT | Performed by: NURSE PRACTITIONER

## 2025-05-17 PROCEDURE — 87086 URINE CULTURE/COLONY COUNT: CPT | Performed by: NURSE PRACTITIONER

## 2025-05-17 PROCEDURE — 3074F SYST BP LT 130 MM HG: CPT | Performed by: NURSE PRACTITIONER

## 2025-05-17 RX ORDER — NITROFURANTOIN 25; 75 MG/1; MG/1
100 CAPSULE ORAL 2 TIMES DAILY
Qty: 14 CAPSULE | Refills: 0 | Status: SHIPPED | OUTPATIENT
Start: 2025-05-17 | End: 2025-05-24

## 2025-05-17 RX ORDER — NITROFURANTOIN 25; 75 MG/1; MG/1
100 CAPSULE ORAL 2 TIMES DAILY
Qty: 14 CAPSULE | Refills: 0 | Status: SHIPPED | OUTPATIENT
Start: 2025-05-17 | End: 2025-05-17

## 2025-05-17 NOTE — PROGRESS NOTES
CHIEF COMPLAINT:     Chief Complaint   Patient presents with    UTI     X 1 day  Sx: blood, pain        HPI:   Elizabeth Reese is a 55 year old female who presents with symptoms of UTI. Complaining of blood in urine, burning and frequency. For last 1 day. Symptoms have been same since onset.  Treatments tried: none.  Associated symptoms: none.   Denies flank pain, fever, hematuria, nausea, or vomiting.  Denies vaginal discharge or itching.   Other  hx: no  Hx of pyelonephritis:no  Hx of kidney stone: no    Current Medications[1]   Past Medical History[2]   Social History:  Short Social Hx on File[3]      REVIEW OF SYSTEMS:   GENERAL: Denies fever, chills, or body aches; good appetite  SKIN: no rashes, no skin wounds or ulcers.  EYES:denies blurred vision or double vision  HEENT: no congestion, rhinorrhea, sore throat or ear pain  CARDIOVASCULAR: denies chest pain or palpitations  LUNGS: denies shortness of breath, cough, or wheezing  GI: See HPI. No N/V/C/D.   : See HPI.  NEURO: no headaches.    EXAM:   /78   Pulse 83   Temp 97.4 °F (36.3 °C)   Resp 16   Ht 5' 1\" (1.549 m)   Wt 165 lb (74.8 kg)   SpO2 97%   BMI 31.18 kg/m²   GENERAL: well developed, well nourished,in no apparent distress  CARDIO: RRR, no murmurs  LUNGS: clear to ausculation bilaterally, no wheezing or rhonchi  GI: BS present x 4.  No hepatosplenomegaly, no tenderness  : no suprapubic tenderness, bladder distention, or CVAT   EXTREMITIES: no edema    Recent Results (from the past 24 hours)   URINALYSIS, AUTO, W/O SCOPE    Collection Time: 05/17/25  8:55 AM   Result Value Ref Range    Glucose Urine Negative Negative mg/dL    Bilirubin Urine Negative Negative    Ketones, UA Negative Negative - Trace mg/dL    Spec Gravity 1.015 1.005 - 1.030    Blood Urine Large (A) Negative    PH Urine 7.0 5.0 - 8.0    Protein Urine >=300 (A) Negative - Trace mg/dL    Urobilinogen Urine 0.2 0.2 - 1.0 mg/dL    Nitrite Urine Negative Negative     Leukocyte Esterase Urine Small (A) Negative    APPEARANCE Cloudy Clear    Color Urine Yellow Yellow    Multistix Lot# [id number] Numeric    Multistix Expiration Date 02- Date         ASSESSMENT AND PLAN:   Elizabeth Reese is a 55 year old female presents with UTI symptoms.    ASSESSMENT:  Encounter Diagnosis   Name Primary?    UTI symptoms Yes     1. UTI symptoms  - URINALYSIS, AUTO, W/O SCOPE  - Urine Culture, Routine; Future  - Urine Culture, Routine  - nitrofurantoin monohydrate macro (MACROBID) 100 MG Oral Cap; Take 1 capsule (100 mg total) by mouth 2 (two) times daily for 7 days.  Dispense: 14 capsule; Refill: 0      PLAN: Meds as listed below.  Urine sent for culture. Comfort measures as described in Patient Instructions    Meds & Refills for this Visit:  Requested Prescriptions     Signed Prescriptions Disp Refills    nitrofurantoin monohydrate macro (MACROBID) 100 MG Oral Cap 14 capsule 0     Sig: Take 1 capsule (100 mg total) by mouth 2 (two) times daily for 7 days.       Risk and benefits of medication discussed. Stressed importance of completing full course of antibiotic.       The patient indicates understanding of these issues and agrees to the plan.  The patient is asked to follow up with PCP in 3-5 days if not better. Call if fever, vomiting, worsening symptoms.           [1]   Current Outpatient Medications   Medication Sig Dispense Refill    nitrofurantoin monohydrate macro (MACROBID) 100 MG Oral Cap Take 1 capsule (100 mg total) by mouth 2 (two) times daily for 7 days. 14 capsule 0    Multiple Vitamin (MULTI-VITAMIN DAILY) Oral Tab Take by mouth.      meclizine 25 MG Oral Tab Take 1 tablet (25 mg total) by mouth 3 (three) times daily as needed for Dizziness or Nausea. 30 tablet 1    losartan 50 MG Oral Tab Take 1 tablet (50 mg total) by mouth daily. 90 tablet 1    diclofenac 75 MG Oral Tab EC Take 1 tablet (75 mg total) by mouth 2 (two) times daily. (Patient not taking: Reported on  11/7/2024) 60 tablet 1    cyclobenzaprine 5 MG Oral Tab Take 1 tablet (5 mg total) by mouth 3 (three) times daily as needed for Muscle spasms. (Patient not taking: Reported on 2/28/2025) 30 tablet 2    zolpidem 5 MG Oral Tab Take 1 tablet (5 mg total) by mouth nightly as needed for Sleep. 30 tablet 1   [2]   Past Medical History:   Abdominal pain, unspecified site    Dysuria    Personal history of urinary (tract) infection   [3]   Social History  Socioeconomic History    Marital status:    Tobacco Use    Smoking status: Never    Smokeless tobacco: Never   Vaping Use    Vaping status: Never Used   Substance and Sexual Activity    Alcohol use: Not Currently    Drug use: No    Sexual activity: Not Currently   Other Topics Concern    Caffeine Concern Yes    Exercise No

## 2025-07-23 DIAGNOSIS — I10 PRIMARY HYPERTENSION: ICD-10-CM

## 2025-07-24 RX ORDER — LOSARTAN POTASSIUM 50 MG/1
50 TABLET ORAL DAILY
Qty: 90 TABLET | Refills: 3 | Status: SHIPPED | OUTPATIENT
Start: 2025-07-24

## 2025-07-24 NOTE — TELEPHONE ENCOUNTER
Refill Per Protocol     Requested Prescriptions   Pending Prescriptions Disp Refills    LOSARTAN 50 MG Oral Tab [Pharmacy Med Name: LOSARTAN POTASSIUM 50 MG TAB] 90 tablet 1     Sig: TAKE 1 TABLET BY MOUTH EVERY DAY       Hypertension Medications Protocol Passed - 7/24/2025  2:00 PM        Passed - CMP or BMP in past 12 months        Passed - Last BP reading less than 140/90     BP Readings from Last 1 Encounters:   05/17/25 122/78               Passed - In person appointment or virtual visit in the past 12 mos or appointment in next 3 mos     Recent Outpatient Visits              2 months ago UTI symptoms    [facility], [place] [address], [place], Kristen J, JEANINE    Office Visit    3 months ago Localized swelling, mass and lump, neck    [facility], [place] [address], Noé Hansen MD    Telemedicine    4 months ago Benign paroxysmal positional vertigo of left ear    [facility] Fany Nagel, PT    Office Visit    4 months ago Well woman exam with routine gynecological exam    [facility], [facility] Brigette Espinoza APRN    Office Visit    5 months ago Benign paroxysmal positional vertigo of left ear    [facility], [place] [address], Noé Hansen MD    Office Visit                      Passed - EGFRCR or GFRNAA > 50     GFR Evaluation  EGFRCR: 101 , resulted on 11/6/2024          Passed - Medication is active on med list

## (undated) NOTE — MR AVS SNAPSHOT
[address]  [address] [facility], [place] [id number] [phone]               Thank you for choosing us for your health care visit with Karen Springer DO.   We are glad to serve you and happy to provide you with this feliciano If you have questions, you can call [phone] to talk to our [facility] Staff. Remember, [employer] is NOT to be used for urgent needs. For medical emergencies, dial 911. Visit [email or web address] to learn more.            Visit [facility]

## (undated) NOTE — LETTER
10/15/19        Elizabeth Reese  [address] [facility]      Dear Jessica Blanchard,    [facility] records indicate that you have outstanding lab work and or testing that was ordered for you and has not yet been completed:  Orders Placed This Encounter

## (undated) NOTE — MR AVS SNAPSHOT
[address]  [address]  . [place] [address] [id number] [phone]               Thank you for choosing us for your health care visit with DANNIELLE Marshall.   We are glad to serve you and happy to provide you with this summary of medical emergencies, dial 911. Visit [email or web address] to learn more.            Visit [facility] online at  [email or web address]

## (undated) NOTE — MR AVS SNAPSHOT
[address]  [address] [address] [phone]               Thank you for choosing us for your health care visit with Jeaneth Mckinney DO.   We are glad to serve you and happy to provide you with this feliciano Srinivasan Little [[id number]]    Complete by:  Jan 25, 2017 (Approximate)    Assoc Dx:  General medical exam [Q69.44]           Lipid Panel    Complete by:  Jan 25, 2017 (Approximate)    Assoc Dx:  General medical exam [V32.00]           CBC W Diffe Choose whole grain products Foods high in sodium   Water is best for hydration Fast food.    Eat at home when possible     Tips for increasing your physical activity – Adults who are physically active are less likely to develop some chronic diseases than ad

## (undated) NOTE — LETTER
11/04/21        Elizabeth Reese  [address] [facility]      Dear Salina Marquez,    Our records indicate that you have outstanding lab work and or testing that was ordered for you and has not yet been completed:  Orders Placed This Encount

## (undated) NOTE — MR AVS SNAPSHOT
[address]  [address] [facility], [address] [address] [phone]               Thank you for choosing us for your health care visit with Andrew Vivas DO.   We are glad to serve you and happy to provide you with this feliciano Support Staff. Remember, playnik is NOT to be used for urgent needs. For medical emergencies, dial 911. Visit [email or web address] to learn more.            Visit [facility] online at  [email or web address]